# Patient Record
Sex: FEMALE | Race: WHITE | NOT HISPANIC OR LATINO | Employment: FULL TIME
[De-identification: names, ages, dates, MRNs, and addresses within clinical notes are randomized per-mention and may not be internally consistent; named-entity substitution may affect disease eponyms.]

---

## 2020-05-21 ENCOUNTER — RECORDS - HEALTHEAST (OUTPATIENT)
Dept: ADMINISTRATIVE | Facility: OTHER | Age: 25
End: 2020-05-21

## 2020-05-21 ENCOUNTER — TRANSFERRED RECORDS (OUTPATIENT)
Dept: HEALTH INFORMATION MANAGEMENT | Facility: CLINIC | Age: 25
End: 2020-05-21

## 2020-05-21 LAB
HBA1C MFR BLD: 4.8 % (ref 0–5.6)
HBA1C MFR BLD: 4.8 % (ref 0–5.7)
HIV 1&2 EXT: NORMAL
HIV 1&2 EXT: NORMAL

## 2020-06-10 LAB — PAP SMEAR - HIM PATIENT REPORTED: NORMAL

## 2020-10-12 ENCOUNTER — AMBULATORY - HEALTHEAST (OUTPATIENT)
Dept: LAB | Facility: CLINIC | Age: 25
End: 2020-10-12

## 2020-10-12 ENCOUNTER — TRANSFERRED RECORDS (OUTPATIENT)
Dept: HEALTH INFORMATION MANAGEMENT | Facility: CLINIC | Age: 25
End: 2020-10-12

## 2020-10-12 ENCOUNTER — RECORDS - HEALTHEAST (OUTPATIENT)
Dept: ADMINISTRATIVE | Facility: OTHER | Age: 25
End: 2020-10-12

## 2020-10-12 DIAGNOSIS — O99.810 ABNORMAL MATERNAL GLUCOSE TOLERANCE, ANTEPARTUM: ICD-10-CM

## 2020-10-12 LAB
FASTING STATUS PATIENT QL REPORTED: YES
GLUCOSE 1H P 100 G GLC PO SERPL-MCNC: 181 MG/DL (ref 70–179)
GLUCOSE 2H P 100 G GLC PO SERPL-MCNC: 138 MG/DL (ref 70–154)
GLUCOSE 3H P 100 G GLC PO SERPL-MCNC: 160 MG/DL (ref 70–139)
GLUCOSE P FAST SERPL-MCNC: 88 MG/DL (ref 70–94)

## 2020-10-13 ENCOUNTER — RECORDS - HEALTHEAST (OUTPATIENT)
Dept: ADMINISTRATIVE | Facility: OTHER | Age: 25
End: 2020-10-13

## 2020-10-15 ENCOUNTER — COMMUNICATION - HEALTHEAST (OUTPATIENT)
Dept: EDUCATION SERVICES | Facility: CLINIC | Age: 25
End: 2020-10-15

## 2020-10-19 ENCOUNTER — RECORDS - HEALTHEAST (OUTPATIENT)
Dept: ADMINISTRATIVE | Facility: OTHER | Age: 25
End: 2020-10-19

## 2020-10-20 ENCOUNTER — RECORDS - HEALTHEAST (OUTPATIENT)
Dept: HEALTH INFORMATION MANAGEMENT | Facility: CLINIC | Age: 25
End: 2020-10-20

## 2020-10-22 ENCOUNTER — PATIENT OUTREACH (OUTPATIENT)
Dept: EDUCATION SERVICES | Facility: CLINIC | Age: 25
End: 2020-10-22
Payer: COMMERCIAL

## 2020-10-22 DIAGNOSIS — O24.410 DIET CONTROLLED GESTATIONAL DIABETES MELLITUS (GDM) IN THIRD TRIMESTER: Primary | ICD-10-CM

## 2020-10-22 PROCEDURE — G0108 DIAB MANAGE TRN  PER INDIV: HCPCS

## 2020-10-22 RX ORDER — BLOOD-GLUCOSE METER
EACH MISCELLANEOUS
Qty: 1 KIT | Refills: 0 | Status: SHIPPED | OUTPATIENT
Start: 2020-10-22 | End: 2021-12-06

## 2020-10-22 RX ORDER — LANCETS 33 GAUGE
1 EACH MISCELLANEOUS 4 TIMES DAILY
Qty: 100 EACH | Refills: 3 | Status: SHIPPED | OUTPATIENT
Start: 2020-10-22 | End: 2021-12-06

## 2020-10-22 NOTE — PROGRESS NOTES
"PHONE Diabetes Self-Management Training - Gestational Diabetes  Patient verbally consented to the telephone visit service today: yes    \"our first pregnancy that has progressed this far\"   ~ Family history of DB    SUBJECTIVE/OBJECTIVE:  Winsome Edwards presents today for education related to gestational diabetes.  She is accompanied by self    Patient's gestational diabetes management related comments/concerns: want to make sure this doesn't negatively impact the baby    Patient's emotional response to diabetes: expresses readiness to learn    Relevant co-morbidities and related health problems:  Significant for:  none and Managing my stress to maintain health blood pressure    Current health service and resource utilization related to diabetes (hyperglycemia, hypoglycemia, etc.):  None    There were no vitals taken for this visit.    Pre pregnancy weight: 160-170#  Usually, but lost a baby at 3 months last year and didn't lose the weight, thinks 175-180#  Weight  Now 210.4#    Estimated Date of Delivery: 12/21  Fasting Glucose  No results found for: GLC    1 hour OGTT  No results found for: GLU1    3 hour OGTT    Fasting  No results found for: GLF    1 hour  No results found for: GL1    2 hour  No results found for: GL2    3 hour  No results found for: GL3    History   Smoking Status     Not on file   Smokeless Tobacco     Not on file       Lifestyle and Health Behaviors:  Physical Activity: before the snow, walked 0789-8075 steps, now that they've plowed she hopes to get back to it, aware of getting steps around house, up once an hour    Nutrition:  ~ chronic migraines to has to eat or gets ill; nausea early on so ate lots of bread  Patient eats 3 meals and 0-2 snacks per day.    Breakfast:  Egg sausage cheese breakfast croissant sandwich 1x/week, sometimes oatmeal, today fried egg with 3 garza and 3 cheese, or cheerios with milk with 2-4 oz milk  Snack:  Cup of tea or maybe some cheese and a cutie  Lunch:  Spinach " "wraps 16 g carb (instead of bread) with chic or egg salad or \"preg approved tuna\" with spinach/shai/samuel in wrap or sandwich  Snack:  SF pudding or SF jello with whip cream  Dinner:  Protein (chick, pork, salmon) and veggies (potato, corn, green beans mix) or I not potato small side of \"veggie based\" pasta (tomatoes and mushroom) (has 41g/serving but she doesn't have all) to get some starch in  Bedtime Snack:  No- if didn't have it already might have SF jello, occasion milk    Other time(s) food is eaten? no     Beverages: Water all /day, maybe tea or decaf coffee    Cultural/Taoism diet restrictions: Yes - allergic to banana and melon and avocado and almonds (anaphalactic)    Biggest challenge to healthy eating is:  knowing what to eat    Pre-Senthil Vitamin: Yes    Supplements: Yes - but sleep tabs and migraine tabs  Experiencing nausea?  not any more     Socio/Economic/Cultural considerations:  Support System: family and spouse/significant other (mom is a nurse in [place])    Cultural Influences/Ethnic Background:  American    Health Literacy/Numeracy:  \"With diabetes, it's helpful to use forms and log books to write down blood sugars and what you're eating at times to help understand how foods affect your blood sugars. With this in mind:    How confident are you at filling out medical forms, such as these by yourself?   Extremely  ~ English major , learns by reading, comfortable     Health Beliefs and Attitudes:   Stage of Change: PREPARATION (Decided to change - considering how)    ASSESSMENT:  \"It's nice to know I can still eat carbs\", she cut back, thought it would be easier to add back in if needed.   She expresses motivation, willingness to do what is needed for self care.   Her mom is a nurse out of state and in touch about every day- is supportive.     INTERVENTION:  Ordered One Touch Verio meter and supplies, ketone stix    Educational topics covered today:  GDM diagnosis, pathophysiology, Risks and " Complications of GDM, Means of controlling GDM, Using a Blood Glucose Monitor, Blood Glucose Goals, Logging and Interpreting Glucose Results, Ketone Testing, When to Call a Diabetes Educator or OB Provider, Healthy Eating During Pregnancy, Counting Carbohydrates, Meal Planning for GDM, and Physical Activity    Educational materials provided by email louise:   Danitza Understanding Gestational Diabetes  GDM Log Book  Sharps Disposal  Care After Delivery    Pt verbalized understanding of concepts discussed and recommendations provided today.     PLAN:  Check glucose 4 times daily, before breakfast and 1 hour after each meal.     Check Ketones daily for one week, if negative, reduce testing to once a week.     Physical activity recommended: as tolerated daily.    Meal plan: 2-3 carbs at breakfast, 3-4 carbs at lunch, 3-2 carbs at supper, 1-2 carbs at 3 snacks a day.  Follow consistent CHO meal plan, eat CHO and protein/fat at all meals/snacks.    Call/e-mail/MyChart message diabetes educator if 3 or more blood sugars are above the goal in 1 week or if ketones are positive.     Call/e-mail/MyChart message with questions/concerns.    FOLLOW-UP:  Call or e-mail educator if 3 or more blood sugars are above goal in 1 week- advised let us know Monday or Tuesday if numbers are elevated over the weekend despite following the meal plan.  Call or e-mail with questions or concerns.  Appointment scheduled on 10/29. Please email numbers and food log the night before.    Goals:  1) add carb to meals per recommendation  2) add snacks, ok for small AM and PM, but carb + protein at HS  3) begin BGM and ketone checks    Tash Ho RD, LD, CDE    Time Spent: 62 minutes  Encounter type: Individual    Any diabetes medication dose changes were made via the CDE Protocol and Collaborative Practice Agreement with the patient's OB/GYN provider. A copy of this encounter was shared with the provider.

## 2020-10-23 ENCOUNTER — RECORDS - HEALTHEAST (OUTPATIENT)
Dept: HEALTH INFORMATION MANAGEMENT | Facility: CLINIC | Age: 25
End: 2020-10-23

## 2020-10-29 ENCOUNTER — VIRTUAL VISIT (OUTPATIENT)
Dept: EDUCATION SERVICES | Facility: CLINIC | Age: 25
End: 2020-10-29
Payer: COMMERCIAL

## 2020-10-29 DIAGNOSIS — O24.410 DIET CONTROLLED GESTATIONAL DIABETES MELLITUS (GDM) IN THIRD TRIMESTER: Primary | ICD-10-CM

## 2020-10-29 PROCEDURE — G0108 DIAB MANAGE TRN  PER INDIV: HCPCS | Mod: 95

## 2020-10-29 NOTE — LETTER
10/29/2020         RE: Winsome Edwards  [address]  [facility] [address]        Dear Colleague,    Thank you for referring your patient, Winsome Edwards, to the [facility]. Please see a copy of my visit note below.    Diabetes Self-Management Education & Support    Patient verbally consented to the telephone visit service today: yes    SUBJECTIVE/OBJECTIVE:  Presents for education related to gestational diabetes.    Accompanied by: Self  Diabetes management related comments/concerns: No questions, pretty easy to use the meter, adjusting meals based on numbers. Overall things have been going well. Wasn't able to get glucose meter and monitoring supplies until Monday after lunch, but has been checking since then and overall most glucose numbers in goal.  Gestational weeks: 32  Had any babies over 9 lbs: No  Previously had Gestational Diabetes: No    Cultural Influences/Ethnic Background:  American    There were no vitals taken for this visit.    Weight at last OB visit was 212 lbs.    Estimated Date of Delivery: Data Unavailable    Blood Glucose/Ketone Log:    Date Ketones Fasting Post Breakfast Post Lunch Post Supper   10/26    147 (rice) 123   10/27 neg 94 136 98 139   10/28 neg 97 133 131 128   10/29 neg 95 123 130      Lifestyle and Health Behaviors:  Exercise:: Yes  Days per week of moderate to strenuous exercise (like a brisk walk): 7(3202-0281 steps/day)  On average, minutes per day of exercise at this level: 30  How intense was your typical exercise? : Moderate (like brisk walking)  Exercise Minutes per Week: 210  Meal planning/habits: Carb counting  Meals include: Breakfast, Lunch, Dinner, Morning Snack, Afternoon Snack, Evening Snack  Breakfast: croissant egg, sausage, cheese breakfast sandwich, sometimes with milk but usually water OR 4 slices garza, egg, 2 slices toast  Lunch: spinach wrap with samuel, salad greens, chicken, tomato, ranch + christiano cheese + cutie OR 10/26 had  chinese rice and pot-stickers  Dinner: 1/4 pizza OR ravioli with marinara and corn  Snacks: AM - coffee or milk or cheese, PM - sugar free pudding, Bedtime - English muffin with pistachio butter  Beverages: Water, Coffee, Milk  How many servings of fruits/vegetables per day: -1    Healthy Coping:  Emotional response to diabetes: Ready to learn, Concern for health and well-being  Informal Support system:: Family, Spouse  Stage of change: ACTION (Actively working towards change)    Current Management:  Taking medications for gestational diabetes?: No  Difficulty affording diabetes medication?: No  Difficulty affording diabetes testing supplies?: No    ASSESSMENT:  Ketones: in goal with all negative.   Fasting blood glucoses: 66% in target.  After breakfast: 100% in target.  After lunch: 75% in target.  After dinner: 100% in target.    Patient may benefit from slightly more protein/fat at bedtime snack to help reduce liver glucose output overnight. She will try adding cheese or cottage cheese or Greek yogurt at bedtime snack.     INTERVENTION:  Educational topics covered today:  What to expect after delivery, Future testing for Type 2 diabetes (2 hour OGTT at 6 week post-partum check-up and annual fasting blood glucose level), Risk of GDM and planning ahead for future pregnancies, Recommended lifestyle interventions for reducing the risk of Type 2 Diabetes, When to Call a Diabetes Educator or OB Provider    Educational Materials provided today:  Danitza Preventing Diabetes    PLAN:  Check glucose 4 times daily.  Check ketones once a week when readings are consistently negative.  Continue with recommended physical activity.  Continue to follow recommended meal plan: 30 g carbs at breakfast, 45-60 g carbs at lunch, 45-60 g carbs at supper, 15-30 g carbs at snacks.  Follow consistent CHO meal plan, eat CHO and protein/fat at all meals/snacks.    Call/e-mail/[employer] message diabetes educator if 3 or more blood sugars are  above the goal in 1 week or if ketones are positive.    Lexus Rowland, MPH, RDN, LD, CDCES   Time Spent: 30 minutes  Encounter Type: Individual    Any diabetes medication dose changes were made via the CDE Protocol and Collaborative Practice Agreement with the patient's OB/GYN provider. A copy of this encounter was shared with the provider.

## 2020-10-29 NOTE — PROGRESS NOTES
Diabetes Self-Management Education & Support    Patient verbally consented to the telephone visit service today: yes    SUBJECTIVE/OBJECTIVE:  Presents for education related to gestational diabetes.    Accompanied by: Self  Diabetes management related comments/concerns: No questions, pretty easy to use the meter, adjusting meals based on numbers. Overall things have been going well. Wasn't able to get glucose meter and monitoring supplies until Monday after lunch, but has been checking since then and overall most glucose numbers in goal.  Gestational weeks: 32  Had any babies over 9 lbs: No  Previously had Gestational Diabetes: No    Cultural Influences/Ethnic Background:  American    There were no vitals taken for this visit.    Weight at last OB visit was 212 lbs.    Estimated Date of Delivery: Data Unavailable    Blood Glucose/Ketone Log:    Date Ketones Fasting Post Breakfast Post Lunch Post Supper   10/26    147 (rice) 123   10/27 neg 94 136 98 139   10/28 neg 97 133 131 128   10/29 neg 95 123 130      Lifestyle and Health Behaviors:  Exercise:: Yes  Days per week of moderate to strenuous exercise (like a brisk walk): 7(7098-2300 steps/day)  On average, minutes per day of exercise at this level: 30  How intense was your typical exercise? : Moderate (like brisk walking)  Exercise Minutes per Week: 210  Meal planning/habits: Carb counting  Meals include: Breakfast, Lunch, Dinner, Morning Snack, Afternoon Snack, Evening Snack  Breakfast: croissant egg, sausage, cheese breakfast sandwich, sometimes with milk but usually water OR 4 slices garza, egg, 2 slices toast  Lunch: spinach wrap with samuel, salad greens, chicken, tomato, ranch + christiano cheese + cutie OR 10/26 had chinese rice and pot-stickers  Dinner: 1/4 pizza OR ravioli with marinara and corn  Snacks: AM - coffee or milk or cheese, PM - sugar free pudding, Bedtime - English muffin with pistachio butter  Beverages: Water, Coffee, Milk  How many servings of  fruits/vegetables per day: -1    Healthy Coping:  Emotional response to diabetes: Ready to learn, Concern for health and well-being  Informal Support system:: Family, Spouse  Stage of change: ACTION (Actively working towards change)    Current Management:  Taking medications for gestational diabetes?: No  Difficulty affording diabetes medication?: No  Difficulty affording diabetes testing supplies?: No    ASSESSMENT:  Ketones: in goal with all negative.   Fasting blood glucoses: 66% in target.  After breakfast: 100% in target.  After lunch: 75% in target.  After dinner: 100% in target.    Patient may benefit from slightly more protein/fat at bedtime snack to help reduce liver glucose output overnight. She will try adding cheese or cottage cheese or Greek yogurt at bedtime snack.     INTERVENTION:  Educational topics covered today:  What to expect after delivery, Future testing for Type 2 diabetes (2 hour OGTT at 6 week post-partum check-up and annual fasting blood glucose level), Risk of GDM and planning ahead for future pregnancies, Recommended lifestyle interventions for reducing the risk of Type 2 Diabetes, When to Call a Diabetes Educator or OB Provider    Educational Materials provided today:  Danitza Preventing Diabetes    PLAN:  Check glucose 4 times daily.  Check ketones once a week when readings are consistently negative.  Continue with recommended physical activity.  Continue to follow recommended meal plan: 30 g carbs at breakfast, 45-60 g carbs at lunch, 45-60 g carbs at supper, 15-30 g carbs at snacks.  Follow consistent CHO meal plan, eat CHO and protein/fat at all meals/snacks.    Call/e-mail/[employer] message diabetes educator if 3 or more blood sugars are above the goal in 1 week or if ketones are positive.    Lexus Rowland, MPH, RDN, LD, CDCES   Time Spent: 30 minutes  Encounter Type: Individual    Any diabetes medication dose changes were made via the CDE Protocol and Collaborative Practice Agreement  with the patient's OB/GYN provider. A copy of this encounter was shared with the provider.

## 2020-10-29 NOTE — PATIENT INSTRUCTIONS
Follow up with diabetes education for blood glucose and ketone review on 11/4/20 at 9:30 am    Plan to share your glucose and ketone information with diabetes education once a week, unless otherwise directed.     1. Check glucose 4 times daily, before breakfast daily and 1 hour after each meal, as recommended.    2. Check ketones daily or once a week after they have been negative for 7 days in a row. If ketones are elevated, let your diabetes educator know and continue to check daily until they are negative for 7 days in a row.    3. Continue with recommended physical activity.    4. Continue to follow recommended meal plan: 30 g carbs at breakfast, 45-60 g carbs at lunch, 45-60 g carbs at supper, 15-30 g carbs at snacks.  Follow consistent CHO meal plan, eat CHO and protein/fat at all meals/snacks.    5. Follow-up with OB doctor as recommended.    6. Call or MyChart message your diabetes educator if 3 or more blood sugars are above the goal in 1 week or if ketones are elevated (trace or above).       AFTER YOU DELIVER:  - Continue with healthy eating and physical activity to get back to your pre-pregnancy weight.   - Have a follow-up 2-hour Glucose Tolerance Test at your 6-week post-partum check-up.   - Have your fasting blood sugar checked once a year.  - Plan ahead for future pregnancies - eat healthy, keep active, work with your doctor to check for gestational diabetes early on in the pregnancy and check blood sugars as recommended by your doctor.

## 2020-11-04 ENCOUNTER — MYC MEDICAL ADVICE (OUTPATIENT)
Dept: EDUCATION SERVICES | Facility: OTHER | Age: 25
End: 2020-11-04

## 2020-11-04 ENCOUNTER — VIRTUAL VISIT (OUTPATIENT)
Dept: EDUCATION SERVICES | Facility: CLINIC | Age: 25
End: 2020-11-04
Payer: COMMERCIAL

## 2020-11-04 DIAGNOSIS — O24.410 DIET CONTROLLED GESTATIONAL DIABETES MELLITUS (GDM) IN THIRD TRIMESTER: Primary | ICD-10-CM

## 2020-11-04 PROCEDURE — 98967 PH1 ASSMT&MGMT NQHP 11-20: CPT | Mod: 95

## 2020-11-04 NOTE — PROGRESS NOTES
"  Gestational Diabetes Follow-up Visit    SUBJECTIVE/OBJECTIVE:  Winsome Edwards presents today for education and evaluation of glucose control related to gestational diabetes  Patient verbally consented to the telephone visit service today: yes    She is accompanied by self    Patient's gestational diabetes management related comments/concerns: ketones in urine     Blood Glucose/Ketone Log:      Date Ketones Fasting Post Breakfast Post Lunch Post Supper   10/30 neg 87 153 117 120   10/31 neg 95 116 88 131   11/1 neg 89 102 141 155   11/2 neg 88 145 110 136   11/3 neg 90 97 110 101   11/4 Small trace 85 131      Food log 11/3  B: granola bar   S: SF pudding  L: chicken tenders small fries   D: sausage casserole ( cauli flower rice, veggies +sausage )   S: SF pudding with SF whip cream     Current gestational diabetes management:    Taking medications for gestational diabetes? No     Physical Activity: no regular exercise program    Nutrition:  Patient eats 3 meals and 3 snacks per day and has an inconsistent intake of carbohydrates.    ASSESSMENT:  Ketones: trace .   Fasting blood glucoses: 100% in target.  After breakfast: 66% in target.  After lunch: 80% in target.  After dinner: 80% in target.  Pt voices she has chronic migraines and have been getting worse lately. She feels calling in her numbers causes her stress because she does not like to do a \"bad job\"Reenforced with patient that our team is here to support her and her baby to have a healthy pregnancy and frequent monitoring is for the purpose of guidance / shifting plan when necessary.   Educated pt on low carb diet, need for consistent CHO and Ketone formation. Pt verbalized understanding and will eat bedtime snack and add a carb to her dinner meal     Health Beliefs and Attitudes:   Stage of Change: MAINTENANCE (Working to maintain change, with risk of relapse)    INTERVENTION:  Educational topics covered today:  What to expect after delivery, Future " testing for Type 2 diabetes (2 hour OGTT at 6 week post-partum check-up and annual fasting blood glucose level), Risk of GDM and planning ahead for future pregnancies, Recommended lifestyle interventions for reducing the risk of Type 2 Diabetes, When to Call a Diabetes Educator or OB Provider    Educational Materials provided today:  Danitza Preventing Diabetes    PLAN:  Check glucose 4 times daily.  Check ketones once a week when readings are consistently negative.  Continue with recommended physical activity.  Continue to follow recommended meal plan: 3-4 choices  carbs at breakfast, 4-5 carbs at lunch, 4-5 carbs at supper, 1-2 carbs at snacks.  Follow consistent CHO meal plan, eat CHO and protein/fat at all meals/snacks.    Call/e-mail/[employer] message diabetes educator if 3 or more blood sugars are above the goal in 1 week or if ketones are positive.     FOLLOW-UP:  Follow up with diabetes educator in 1 week via [employer]. [employer] activation sent to pt today.     Call/e-mail/[employer] message diabetes educator if 3 or more blood sugars are above the goal in 1 week or if ketones are positive.     Noe Corral, MS, RD, LD, CDE     The author of this note documented a reason for not sharing it with the patient.    Time spent was 19 minutes  Encounter type: Individual    Any diabetes medication dose changes were made via the CDE Protocol and Collaborative Practice Agreement with the patient's referring provider. A copy of this encounter was shared with the provider.

## 2020-11-04 NOTE — LETTER
"    11/4/2020         RE: Winsome Edwards  [address]  [facility] [address]        Dear Colleague,    Thank you for referring your patient, Winsome Edwards, to the [facility]. Please see a copy of my visit note below.      Gestational Diabetes Follow-up Visit    SUBJECTIVE/OBJECTIVE:  Winsome Edwards presents today for education and evaluation of glucose control related to gestational diabetes  Patient verbally consented to the telephone visit service today: yes    She is accompanied by self    Patient's gestational diabetes management related comments/concerns: ketones in urine     Blood Glucose/Ketone Log:      Date Ketones Fasting Post Breakfast Post Lunch Post Supper   10/30 neg 87 153 117 120   10/31 neg 95 116 88 131   11/1 neg 89 102 141 155   11/2 neg 88 145 110 136   11/3 neg 90 97 110 101   11/4 Small trace 85 131      Food log 11/3  B: granola bar   S: SF pudding  L: chicken tenders small fries   D: sausage casserole ( cauli flower rice, veggies +sausage )   S: SF pudding with SF whip cream     Current gestational diabetes management:    Taking medications for gestational diabetes? No     Physical Activity: no regular exercise program    Nutrition:  Patient eats 3 meals and 3 snacks per day and has an inconsistent intake of carbohydrates.    ASSESSMENT:  Ketones: trace .   Fasting blood glucoses: 100% in target.  After breakfast: 66% in target.  After lunch: 80% in target.  After dinner: 80% in target.  Pt voices she has chronic migraines and have been getting worse lately. She feels calling in her numbers causes her stress because she does not like to do a \"bad job\"Reenforced with patient that our team is here to support her and her baby to have a healthy pregnancy and frequent monitoring is for the purpose of guidance / shifting plan when necessary.   Educated pt on low carb diet, need for consistent CHO and Ketone formation. Pt verbalized understanding and will eat bedtime snack " and add a carb to her dinner meal     Health Beliefs and Attitudes:   Stage of Change: MAINTENANCE (Working to maintain change, with risk of relapse)    INTERVENTION:  Educational topics covered today:  What to expect after delivery, Future testing for Type 2 diabetes (2 hour OGTT at 6 week post-partum check-up and annual fasting blood glucose level), Risk of GDM and planning ahead for future pregnancies, Recommended lifestyle interventions for reducing the risk of Type 2 Diabetes, When to Call a Diabetes Educator or OB Provider    Educational Materials provided today:  Danitza Preventing Diabetes    PLAN:  Check glucose 4 times daily.  Check ketones once a week when readings are consistently negative.  Continue with recommended physical activity.  Continue to follow recommended meal plan: 3-4 choices  carbs at breakfast, 4-5 carbs at lunch, 4-5 carbs at supper, 1-2 carbs at snacks.  Follow consistent CHO meal plan, eat CHO and protein/fat at all meals/snacks.    Call/e-mail/[employer] message diabetes educator if 3 or more blood sugars are above the goal in 1 week or if ketones are positive.     FOLLOW-UP:  Follow up with diabetes educator in 1 week via [employer]. [employer] activation sent to pt today.     Call/e-mail/[employer] message diabetes educator if 3 or more blood sugars are above the goal in 1 week or if ketones are positive.     Noe Corral, MS, RD, LD, CDE       Time spent was 19 minutes  Encounter type: Individual    Any diabetes medication dose changes were made via the CDE Protocol and Collaborative Practice Agreement with the patient's referring provider. A copy of this encounter was shared with the provider.

## 2020-11-11 NOTE — TELEPHONE ENCOUNTER
Gestational Diabetes Follow-up    Subjective/Objective:    Winsome Edwards sent in blood glucose log for review. Last date of communication was: 11/4/2020.    Gestational diabetes is being managed with diet and activity    Taking diabetes medications: no    Estimated Date of Delivery: Data Unavailable    BG/Food Log:         Assessment:    Ketones: neg.   Fasting blood glucoses: 89% in target.  After breakfast: 66% in target.  After lunch: 100% in target.  After dinner: 66% in target.    Plan/Response:  No changes in the patient's current treatment plan.  Follow-up in 1 week.    DMITRI Wong CDE    Any diabetes medication dose changes were made via the CDE Protocol and Collaborative Practice Agreement with the patient's OB/GYN provider. A copy of this encounter was shared with the provider.

## 2020-11-16 ENCOUNTER — TELEPHONE (OUTPATIENT)
Dept: EDUCATION SERVICES | Facility: CLINIC | Age: 25
End: 2020-11-16

## 2020-11-16 NOTE — TELEPHONE ENCOUNTER
Gestational Diabetes Follow-up    Subjective/Objective:    Winsome Edwards sent in blood glucose log for review. Last date of communication was: 11/4/2020.    Gestational diabetes is being managed with diet and activity    Taking diabetes medications: no         Estimated Date of Delivery: Data Unavailable    BG/Food Log:   Winsome Reddy  1995    I tried to send this message through [employer] but it wouldn t let me for whatever reason. I am freaking out a bit and reaching out in hopes someone can give me some insight.     I have had good numbers for everything except the first thing in the morning. I do not understand what is happening. I have the same snack every night before bed and the last three days in a row have been high for some reason. Do we know what could be causing this?    I have a whole wheat english muffin with peanut butter, a light & fit yogurt, and a glass of milk before bed. I have attached a photo of my numbers since last Wednesday s check in.    Thank you,          Assessment:    Ketones: neg.   Fasting blood glucoses: 20% in target.  After breakfast: 100% in target.  Before lunch: -% in target.  After lunch: 100% in target.  Before dinner: -% in target.  After dinner: 100% in target.    Plan/Response:  Ahsan Khan,    Thank you for sending your numbers. I m glad you reached out with your concerns. Your fasting numbers are a bit high but all other blood sugars look great!  If you are having a whole wheat english muffin with peanut butter, a light & fit yogurt, and a glass of milk before bed, this may be too much carbohydrates.  Your bedtime snack should be 30 grams of carb with a 14 grams of protein.      Below is a list of snack ideas. You choose 2 items from the protein list and 2 items from the carbohydrate list.    So an example: 1 whole English muffin is 2 choices from the carbohydrate list and adding 2 TBSP of peanut butter would be 1 choice from the protein list, you would just  need 1 more choice from the protein list. If you like having milk before bed I would recommend you try   cup of the fairlife milk. It is higher in protein and lower in carbs.     Also sometimes one snack works better than another so you may want to try a couple of different combinations. I would like you to try a couple of different snacks over the next couple days to see if that will take care of your high fasting numbers. Plan on sending in your blood sugars again on Thursday so we can review them before the weekend. Make a note of the different snacks you try and we can make some additional suggestions if needed.   You really are doing a great job! Just a small change may be all that you need!          Katrin Feldman RN, [facility]  Diabetes         Any diabetes medication dose changes were made via the CDE Protocol and Collaborative Practice Agreement with the patient's referring provider. A copy of this encounter was shared with the provider.

## 2020-11-19 ENCOUNTER — TELEPHONE (OUTPATIENT)
Dept: EDUCATION SERVICES | Facility: CLINIC | Age: 25
End: 2020-11-19

## 2020-11-19 NOTE — TELEPHONE ENCOUNTER
Gestational Diabetes Follow-up    Subjective/Objective:    Winsome Grace sent in blood glucose log for review. Last date of communication was: 11-16-20.    Gestational diabetes is being managed with diet and activity    Taking diabetes medications: no    Estimated Date of Delivery: 12-21-20    BG/Food Log:       Assessment:    Ketones: Negative.   Fasting blood glucoses: 66% in target since  Making changes to bedtime snack on 11/16.  After breakfast: 100% in target.  After lunch: 100% in target.  After dinner: 100% in target.    Email from patient:  Winsome Reddy  1995    Okay so we left off on the pre-breakfast glucose test for 11/16. Everything has been fine as you can see. I switched my evening snacks to what is listed below:    11/16 - English Muffin with peanut butter and 1/2 cup of Fairlife milk  11/17 -  English Muffin with peanut butter and 1/2 cup of cottage cheese  11/18 - English Muffin with peanut butter and 1/2 cup of Fairlife milk     This morning it was high so I m wondering if maybe I had to much milk or peanut butter.     Thank you,  Winsome Edwards        Plan/Response:  No changes in the patient's current treatment plan.  Follow-up on Monday.    Email response:  Dear Winsome,    Thank you so much for sending in your records! After reviewing your records these are my recommendations:    Meal plan: Continue with the current bedtime snacks. It is okay that it doesn't work every night, we just want it to work most of the nights.     Blood sugar testing: No change.    Ketone testing: No change.    Follow-up: Please send records for review again on Monday November 23rd. Also, it looks like MyChart wasn't working for you because you had no care team members assigned. I added myself to your care team, so just pick my name to send a message to and it comes to the entire diabetes educator group.     Please feel free to reach out sooner with any questions or concerns. Thanks so much!    Ting  Macey ANDERS, [facility]      Any diabetes medication dose changes were made via the CDE Protocol and Collaborative Practice Agreement with the patient's OB/GYN provider. A copy of this encounter was shared with the provider.

## 2020-11-19 NOTE — LETTER
11/19/2020        RE: Winsome Edwards  [address] [place] [address]        Gestational Diabetes Follow-up    Subjective/Objective:    Winsome Edwards sent in blood glucose log for review. Last date of communication was: 11-16-20.    Gestational diabetes is being managed with diet and activity    Taking diabetes medications: no    Estimated Date of Delivery: 12-21-20    BG/Food Log:       Assessment:    Ketones: Negative.   Fasting blood glucoses: 66% in target since  Making changes to bedtime snack on 11/16.  After breakfast: 100% in target.  After lunch: 100% in target.  After dinner: 100% in target.    Email from patient:  Winsome Reddy Coon  1995    Okay so we left off on the pre-breakfast glucose test for 11/16. Everything has been fine as you can see. I switched my evening snacks to what is listed below:    11/16 - English Muffin with peanut butter and 1/2 cup of Fairlife milk  11/17 -  English Muffin with peanut butter and 1/2 cup of cottage cheese  11/18 - English Muffin with peanut butter and 1/2 cup of Fairlife milk     This morning it was high so I m wondering if maybe I had to much milk or peanut butter.     Thank you,  Winsome Edwards        Plan/Response:  No changes in the patient's current treatment plan.  Follow-up on Monday.    Email response:  Dear Winsome,    Thank you so much for sending in your records! After reviewing your records these are my recommendations:    Meal plan: Continue with the current bedtime snacks. It is okay that it doesn't work every night, we just want it to work most of the nights.     Blood sugar testing: No change.    Ketone testing: No change.    Follow-up: Please send records for review again on Monday November 23rd. Also, it looks like [employer] wasn't working for you because you had no care team members assigned. I added myself to your care team, so just pick my name to send a message to and it comes to the entire diabetes educator group.     Please feel free  to reach out sooner with any questions or concerns. Thanks so much!    Ting Choudhury RN, [facility]      Any diabetes medication dose changes were made via the CDE Protocol and Collaborative Practice Agreement with the patient's OB/GYN provider. A copy of this encounter was shared with the provider.            Sincerely,        Ting Choudhury, RN

## 2020-11-23 ENCOUNTER — MYC MEDICAL ADVICE (OUTPATIENT)
Dept: EDUCATION SERVICES | Facility: CLINIC | Age: 25
End: 2020-11-23

## 2020-11-23 NOTE — TELEPHONE ENCOUNTER
Gestational Diabetes Follow-up    Subjective/Objective:    Winsome Edwards sent in blood glucose log for review. Last date of communication was: 11-19-20.    Gestational diabetes is being managed with diet and activity    Taking diabetes medications: no    Estimated Date of Delivery: 12-21-20    BG/Food Log:           Assessment:    Ketones: Small.   Fasting blood glucoses: 75% in target since 11/20.  After breakfast: 100% in target.  After lunch: 100% in target.  After dinner: 100% in target.    Plan/Response:  Meal Plan Recommendation: Try a small carb (10-15 grams) snack in the middle of the night (ex: glass of milk, small granola bar, crackers).  Check ketones every morning for the next several days.   Follow-up on Friday.  MyChart response sent.    Ting Choudhury RN, [facility]      Any diabetes medication dose changes were made via the CDE Protocol and Collaborative Practice Agreement with the patient's OB/GYN provider. A copy of this encounter was shared with the provider.

## 2020-11-23 NOTE — LETTER
11/23/2020        RE: Winsome Edwards  [address] [place] [address]        Gestational Diabetes Follow-up    Subjective/Objective:    Winsome Coon sent in blood glucose log for review. Last date of communication was: 11-19-20.    Gestational diabetes is being managed with diet and activity    Taking diabetes medications: no    Estimated Date of Delivery: 12-21-20    BG/Food Log:           Assessment:    Ketones: Small.   Fasting blood glucoses: 75% in target since 11/20.  After breakfast: 100% in target.  After lunch: 100% in target.  After dinner: 100% in target.    Plan/Response:  Meal Plan Recommendation: Try a small carb (10-15 grams) snack in the middle of the night (ex: glass of milk, small granola bar, crackers).  Check ketones every morning for the next several days.   Follow-up on Friday.  MyChart response sent.    Ting Choudhury RN, [facility]      Any diabetes medication dose changes were made via the CDE Protocol and Collaborative Practice Agreement with the patient's OB/GYN provider. A copy of this encounter was shared with the provider.            Sincerely,        Ting Choudhury RN

## 2020-11-24 ENCOUNTER — MYC MEDICAL ADVICE (OUTPATIENT)
Dept: EDUCATION SERVICES | Facility: CLINIC | Age: 25
End: 2020-11-24

## 2020-12-02 ENCOUNTER — VIRTUAL VISIT (OUTPATIENT)
Dept: EDUCATION SERVICES | Facility: CLINIC | Age: 25
End: 2020-12-02
Payer: COMMERCIAL

## 2020-12-02 ENCOUNTER — MYC MEDICAL ADVICE (OUTPATIENT)
Dept: EDUCATION SERVICES | Facility: CLINIC | Age: 25
End: 2020-12-02

## 2020-12-02 ENCOUNTER — COMMUNICATION - HEALTHEAST (OUTPATIENT)
Dept: EDUCATION SERVICES | Facility: CLINIC | Age: 25
End: 2020-12-02

## 2020-12-02 DIAGNOSIS — O24.414 INSULIN CONTROLLED GESTATIONAL DIABETES MELLITUS (GDM) IN THIRD TRIMESTER: ICD-10-CM

## 2020-12-02 DIAGNOSIS — O24.410 DIET CONTROLLED GESTATIONAL DIABETES MELLITUS (GDM) IN THIRD TRIMESTER: Primary | ICD-10-CM

## 2020-12-02 PROCEDURE — 98968 PH1 ASSMT&MGMT NQHP 21-30: CPT | Mod: 95

## 2020-12-02 NOTE — TELEPHONE ENCOUNTER
Gestational Diabetes Follow-up    Subjective/Objective:    Winsome Edwards sent in blood glucose log for review. Last date of communication was: 11/24/2020    Gestational diabetes is being managed with diet and activity    Taking diabetes medications: no    Estimated Date of Delivery: Data Unavailable    BG/Food Log:         Assessment:    Ketones: na.   Fasting blood glucoses: 20% in target.  After breakfast: 75% in target.  After lunch: 100% in target.  After dinner: 100% in target.    Fasting blood sugars have been ~60% in target for the last 2 weeks now, and have now been consistently above target. Would recommend initiation of insulin - will send [employer] message offering insulin start phone visit if desired.     Plan/Response:  Recommend that patient begin NPH insulin - will send fax to [facility] recommending insulin, and route to [facility] CDEs to coordinate insulin start and [facility] to [facility] in [place]    DMITRI Wong CDE    Any diabetes medication dose changes were made via the CDE Protocol and Collaborative Practice Agreement with the patient's OB/GYN provider. A copy of this encounter was shared with the provider.

## 2020-12-02 NOTE — LETTER
12/2/2020         RE: Winsome Edwards  [address]  [place] [address]        Dear Colleague,    Thank you for referring your patient, Winsome Edwards, to the [facility]. Please see a copy of my visit note below.    No notes on file    Gestational Diabetes Follow-up     Subjective/Objective:     Winsome Edwards sent in blood glucose log for review. Last date of communication was: 11/24/2020     Gestational diabetes is being managed with diet and activity     Taking diabetes medications: no     Estimated Date of Delivery: Data Unavailable     BG/Food Log:          Assessment:     Ketones: na.   Fasting blood glucoses: 20% in target.  After breakfast: 75% in target.  After lunch: 100% in target.  After dinner: 100% in target.     Fasting blood sugars have been ~60% in target for the last 2 weeks now, and have now been consistently above target. Would recommend initiation of insulin - will send August message offering insulin start phone visit if desired.      Plan/Response:  Recommend that patient begin NPH insulin - will send fax to [facility] recommending insulin, and route to [facility] CDEs to coordinate insulin start and send to [facility] in [place]     DMITRI Wong CDE     Any diabetes medication dose changes were made via the CDE Protocol and Collaborative Practice Agreement with the patient's OB/GYN provider. A copy of this encounter was shared with the provider.

## 2020-12-02 NOTE — PROGRESS NOTES
Patient verbally consented to the telephone visit service today: yes    Gestational Diabetes Follow-up    Subjective/Objective:    Winsome Edwards was called for insulin instruction phone call. Last date of communication was: 12/2/2020.    Assessment:    Winsome was given insulin instruction for NPH pens over the phone. She understands that she will be [facility], and she will follow her blood sugars through delivery.     Plan/Response:  Recommend that patient begin 8 units NPH insulin - prescription already sent to pharmacy, scheduled with Mariely 12/9    DMITRI Wong CDE  Time SPent: 27 minutes    Any diabetes medication dose changes were made via the CDE Protocol and Collaborative Practice Agreement with the patient's OB/GYN provider. A copy of this encounter was shared with the provider.

## 2020-12-09 ENCOUNTER — OFFICE VISIT - HEALTHEAST (OUTPATIENT)
Dept: ENDOCRINOLOGY | Facility: CLINIC | Age: 25
End: 2020-12-09

## 2020-12-09 DIAGNOSIS — O24.414 INSULIN CONTROLLED GESTATIONAL DIABETES MELLITUS (GDM) IN THIRD TRIMESTER: ICD-10-CM

## 2020-12-10 ENCOUNTER — AMBULATORY - HEALTHEAST (OUTPATIENT)
Dept: OBGYN | Facility: CLINIC | Age: 25
End: 2020-12-10

## 2020-12-10 DIAGNOSIS — Z33.1 PREGNANT STATE, INCIDENTAL: ICD-10-CM

## 2020-12-11 ENCOUNTER — AMBULATORY - HEALTHEAST (OUTPATIENT)
Dept: LAB | Facility: CLINIC | Age: 25
End: 2020-12-11

## 2020-12-11 DIAGNOSIS — Z33.1 PREGNANT STATE, INCIDENTAL: ICD-10-CM

## 2020-12-13 ENCOUNTER — COMMUNICATION - HEALTHEAST (OUTPATIENT)
Dept: EMERGENCY MEDICINE | Facility: CLINIC | Age: 25
End: 2020-12-13

## 2020-12-13 ENCOUNTER — COMMUNICATION - HEALTHEAST (OUTPATIENT)
Dept: SCHEDULING | Facility: CLINIC | Age: 25
End: 2020-12-13

## 2020-12-14 ENCOUNTER — TRANSFERRED RECORDS (OUTPATIENT)
Dept: HEALTH INFORMATION MANAGEMENT | Facility: CLINIC | Age: 25
End: 2020-12-14

## 2020-12-15 ENCOUNTER — HOSPITAL ENCOUNTER (OUTPATIENT)
Dept: OBGYN | Facility: CLINIC | Age: 25
Discharge: HOME | End: 2020-12-15

## 2020-12-16 ENCOUNTER — ANESTHESIA - HEALTHEAST (OUTPATIENT)
Dept: OBGYN | Facility: CLINIC | Age: 25
End: 2020-12-16

## 2020-12-18 ENCOUNTER — HOME CARE/HOSPICE - HEALTHEAST (OUTPATIENT)
Dept: HOME HEALTH SERVICES | Facility: HOME HEALTH | Age: 25
End: 2020-12-18

## 2021-01-09 ENCOUNTER — HEALTH MAINTENANCE LETTER (OUTPATIENT)
Age: 26
End: 2021-01-09

## 2021-06-12 NOTE — TELEPHONE ENCOUNTER
10/14/2020 3:47pm inside DM Consult  [facility] [place]  - [phone]  Referring: Dr Giuliana Black  DX: GDM    No DM Order.

## 2021-06-13 NOTE — TELEPHONE ENCOUNTER
Date: 12/9/2020 Status: [facility]   Time: 11:40 AM Length: 20   Visit Type: VIDEO VISIT RETURN [1702] Copay: $0.00   Provider: Irma Chen NP

## 2021-06-13 NOTE — PROGRESS NOTES
"Winsome Edwards is a 25 y.o. female who is being evaluated via a billable video visit.      The patient has been notified of following:     \"This video visit will be conducted via a call between you and your physician/provider. We have found that certain health care needs can be provided without the need for an in-person physical exam.  This service lets us provide the care you need with a video conversation.  If a prescription is necessary we can send it directly to your pharmacy.  If lab work is needed we can place an order for that and you can then stop by our lab to have the test done at a later time.    Video visits are billed at different rates depending on your insurance coverage. Please reach out to your insurance provider with any questions.    If during the course of the call the physician/provider feels a video visit is not appropriate, you will not be charged for this service.\"    Patient has given verbal consent to a Video visit? Yes  How would you like to obtain your AVS? AVS Preference: Cosharedhart.  If dropped by the video visit, the video invitation should be sent to: [phone]   Will anyone else be joining your video visit? No        Video Start Time: 1140    Additional provider notes:      Reason for visit      1. Insulin controlled gestational diabetes mellitus (GDM) in third trimester        HPI     Winsome Edwards is a very pleasant 25 y.o. old female who presents for GESTATIONAL Diabetes Mellitus.  She is currently 38  weeks pregnant . Induction planned on .  Diagnosed with GDM based on an OGTT. She hasnot had  GDM in prior pregnancies.   Current carbohydrate intake:consistent with recommendations of 30g-60g-60g.  I have reviewed her blood glucose logs and note that the:  Fasting readings  are:in range on current regimen  Postprandial readings are:in range on current regimen  Current NPH dose: 8 units  Current Prandial insulin: 0  Blood glucose logs/meter brought in and data reviewed " and incorporated into decision-making.  Planned delivery at: [facility]  OBGYN: [facility]    Therapy/Interventions in the past:  She has been seen by the Diabetes Educator- and has received instruction on carbohydrate counting and  consistency.  Records from referring provider and other sources have also been reviewed and incorporated into decision-making.      TODAY:    Winsome is contacted today after starting insulin for GDM. She will be induced next week. She has provided BG and they look good.  She had a couple of elevations earlier in the week, but since then they have been fine. She also had two post lunch and two post dinner elevations, which seemed explainable. Baby is moving and she is having some swelling in her R leg. Baby is passing BPPs and NSTs and there are no current concerns from her OB. Postpartum instructions given today and all questions answered to her satisfaction.    12/01  101  112  119  110  12/02  102  137  155  172  12/03  90  128  122  121  12/04  97  132  91  120  12/05  94  115  153  134  12/06  89  113  120  154  12/07  90  133  136  100  12/08  93  128  109  99  12/09  92  128    Past Medical History       Patient Active Problem List   Diagnosis     Encounter for supervision of normal first pregnancy     Obesity     Pregnant     Insulin controlled gestational diabetes mellitus (GDM) in third trimester        Past Surgical History     No past surgical history on file.    Family History     No family history on file.    Social History     Social History     Tobacco Use     Smoking status: Not on file   Substance Use Topics     Alcohol use: Not on file     Drug use: Not on file       Review of Systems     Patient has no polyuria or polydipsia, no chest pain, dyspnea or TIA's, no numbness, tingling or pain in extremities  Remainder negative except as noted in HPI.      Vital Signs     There were no vitals taken for this visit.  Wt Readings from Last 3 Encounters:   No data found for Wt        Physical Exam         Assessment     1. Insulin controlled gestational diabetes mellitus (GDM) in third trimester        Plan     1. GESTATIONAL DIABETES-  Adjust dose as follows:    -NPH insulin8   units. Increase by 2 units every 2 days to keep fasting blood glucose below 95mg/dL  -Novolog 0  units with breakfast  -Novolog 0 units with lunch   -Novolog 0 units with dinner  -Increase by 0 units every 2 days to keep 1 hour after meal blood glucose less than 140mg/dL    We reviewed glucose goals of fasting blood glucose <95 mg/dL and 1 hour post prandial blood glucose of <140 mg/dL.    Monitor blood sugar 4 times daily: Fasting  and 1 hour after each meal.  Contact  this clinic [phone] if blood glucose is not within the above-mentioned goals.     We discussed the importance of excellent glycemic control during pregnancy to limit complications such as fetal macrosomia, shoulder dystocia,  hypoglycemia and hyperbilirubinemia.  I have discussed the patient's increased risk of recurrent GDM and/or development of type 2 diabetes later in life.        F/up with A1c 3 months postpartum with PCP.    May be a candidate for metformin postpartum as she has more than 1 risk factor for future Type 2 Diabetes Mellitus.    She will need a screening A1c at least annually, TLC- including carbohydrate control and exercise.    After you deliver the baby, it is suggested to check your blood sugar occasionally (1 - 2 times per week) for 6 weeks.     When you are not pregnant, normal blood sugars are:       Fasting (before eating anything in the morning)  - under 100 mg/dl    2 hours after meals under 140  The American Diabetes Association recommends:     a glucose tolerance test 6 weeks after you deliver the baby.         a hemoglobin Alc test yearly after delivery.  The Alc test is a 2-3 month average blood sugar reading.        Discuss getting these tests with your provider.       After delivery, and your provider  "has said that it is safe to be active, work toward getting 150 minutes each week of physical activity to decrease your risk of  getting type 2 diabetes.       Maintaining a healthy body weight will also decrease your risk of getting type 2 diabetes.               Lab Results     Hemoglobin A1c_EXT   Date Value Ref Range Status   05/21/2020 4.8 <5.7 % Final       No results found for: CHOL, HDL, LDLCALC, TRIG    No results found for: ALT, AST, GGT, ALKPHOS, BILITOT      Current Medications     Outpatient Medications Prior to Visit   Medication Sig Dispense Refill     blood glucose test strips Use 4 each As Directed daily. Dispense brand per patient's insurance at pharmacy discretion.       HUMULIN N NPH INSULIN KWIKPEN 100 unit/mL (3 mL) pen Inject 8 units every night before bed.  Increase by 2 units every 2 days until morning reading is less than 95.  Max daily dose 30 units. 9 mL 1     pen needle, diabetic 32 gauge x 5/32\" Ndle Use 1 each As Directed daily. 100 each 1     No facility-administered medications prior to visit.        Video-Visit Details    Type of service:  Video Visit    Video End Time (time video stopped): 1200  Originating Location (pt. Location): Home    Distant Location (provider location):  [facility]     Platform used for Video Visit: Rohini [id number]    "

## 2021-06-13 NOTE — TELEPHONE ENCOUNTER
Rx has been sent to requested pharmacy.  Please schedule patient in pregnancy clinic next week.  If need to DB, please ask Leah or me depending on the day.  Thanks,  Jenniffer

## 2021-06-13 NOTE — ANESTHESIA PROCEDURE NOTES
Epidural Block    Patient location during procedure: OB  Time Called: 12/16/2020 8:23 AM  Reason for Block:at surgeon's request and labor epidural  Staffing:  Performing  Anesthesiologist: Devang Romeo MD  Preanesthetic Checklist  Completed: patient identified, risks, benefits, and alternatives discussed, timeout performed, consent obtained, at patient's request, airway assessed, oxygen available, suction available, emergency drugs available and hand hygiene performed  Procedure  Patient position: sitting  Prep: ChloraPrep  Patient monitoring: continuous pulse oximetry, heart rate and blood pressure  Approach: midline  Location: L2-L3  Injection technique: SARAH saline  Number of Attempts:1  Needle  Needle type: Tuohy   Needle gauge: 17 G     Catheter in Space: 5  Assessment  Sensory level: T10  No complications      Additional Notes:  No CSF.  No Heme.  Infusion connected after test dose negative.  No issues.  Patient tolerated well. Pump reviewed and started.  Patients vital signs stable.  No LAST.  RN was in room the whole time.

## 2021-06-13 NOTE — ANESTHESIA PREPROCEDURE EVALUATION
Anesthesia Evaluation      Patient summary reviewed   No history of anesthetic complications     Airway   Mallampati: III  Neck ROM: full   Pulmonary - negative ROS and normal exam    breath sounds clear to auscultation                         Cardiovascular - negative ROS and normal exam  Exercise tolerance: good  Rhythm: regular  Rate: normal,         Neuro/Psych - negative ROS     Endo/Other    (+) diabetes mellitus, obesity, pregnant     GI/Hepatic/Renal - negative ROS      Other findings: Gravid. Denies PIH, or Preeclampsia.          Dental    (+) poor dentition and chipped                       Anesthesia Plan  Planned anesthetic: epidural  Labor epidural risks and benefits discussed with patient.  All questions answered.  Consent signed.  Patient wishes to proceed.  RN present.    ASA 3     Anesthetic plan and risks discussed with: patient and spouse  Anesthesia plan special considerations: increased risk of difficult airway,   Post-op plan: routine recovery

## 2021-06-13 NOTE — TELEPHONE ENCOUNTER
Coronavirus (COVID-19) Notification    Reason for call  Notify of POSITIVE  COVID-19 lab result, assess symptoms,  review [facility] recommendations    Lab Result   Lab test for 2019-nCoV rRt-PCR or SARS-COV-2 PCR  Oropharyngeal AND/OR nasopharyngeal swabs were POSITIVE for 2019-nCoV RNA [OR] SARS-COV-2 RNA (COVID-19) RNA     We have been unable to reach Patient by phone at this time to notify of their Positive COVID-19 result.  Left voicemail message requesting a call back to [phone] [facility] for results.        POSITIVE COVID-19 Letter sent.    Rhoda Magana RN

## 2021-06-13 NOTE — TELEPHONE ENCOUNTER
Patient has been followed by [facility] for gestational diabetes and now has elevated fasting glucose levels, requiring insulin start. Recommend NPH insulin at bedtime and for patient to get established with the [facility] with Mariely Chen NP.    Preferred pharmacy: [facility]    Once insulin is ordered, please route to scheduling team to make appointment with [facility].    Alethea Stephens RD [facility]

## 2021-06-13 NOTE — TELEPHONE ENCOUNTER
Call connected with [facility] d/t COVID diagnosis today.  Staff will speak with pt about her induction tomorrow.      Hue Masterson RN, FNA

## 2021-06-13 NOTE — ANESTHESIA POSTPROCEDURE EVALUATION
Patient: Winsome Edwards  * No procedures listed *  Anesthesia type: epidural    Patient location: Labor and Delivery  Last vitals: No vitals data found for the desired time range.    Post vital signs: stable  Level of consciousness: awake and responds to simple questions  Post-anesthesia pain: pain controlled  Post-anesthesia nausea and vomiting: no  Pulmonary: unassisted, return to baseline  Cardiovascular: stable and blood pressure at baseline  Hydration: adequate  Anesthetic events: no

## 2021-06-13 NOTE — TELEPHONE ENCOUNTER
"Coronavirus (COVID-19) Notification    Caller Name (Patient, parent, daughter/son, grandparent, etc)  Pt    Reason for call  Notify of Positive Coronavirus (COVID-19) lab results, assess symptoms,  review [facility] recommendations    Lab Result    Lab test:  2019-nCoV rRt-PCR or SARS-CoV-2 PCR    Oropharyngeal AND/OR nasopharyngeal swabs is POSITIVE for 2019-nCoV RNA/SARS-COV-2 PCR (COVID-19 virus)    RN Recommendations/Instructions per [facility] Coronavirus COVID-19 recommendations    Brief introduction script  Introduce self and then review script:  \"I am calling on behalf of [employer].  We were notified that your Coronavirus test (COVID-19) for was POSITIVE for the virus.  I have some information to relay to you but first I wanted to mention that the [place] Dept of Health will be contacting you shortly [it's possible MD already called Patient] to talk to you more about how you are feeling and other people you have had contact with who might now also have the virus.  Also, [facility] is Partnering with the [facility] for Covid-19 research, you may be contacted directly by research staff.\"    Assessment (Inquire about Patient's current symptoms)   Assessment   Current Symptoms at time of phone call: (if no symptoms, document No symptoms] None   Symptom onset (if applicable) 0     If at time of call, Patients symptoms hare worsened, the Patient should contact 911 or have someone drive them to Emergency Dept promptly:      If Patient calling 911, inform 911 personal that you have tested positive for the Coronavirus (COVID-19).  Place mask on and await 911 to arrive.    If Emergency Dept, If possible, please have another adult drive you to the Emergency Dept but you need to wear mask when in contact with other people.      Review information with Patient    Your result was positive. This means you have COVID-19 (coronavirus).  We have sent you a letter that reviews the information that " I'll be reviewing with you now.    How can I protect others?    If you have symptoms: stay home and away from others (self-isolate) until:    You've had no fever--and no medicine that reduces fever--for 1 full day (24 hours). And      Your other symptoms have gotten better. For example, your cough or breathing has improved. And     At least 10 days have passed since your symptoms started. (If you ve been told by a doctor that you have a weak immune system, wait 20 days.)     If you don't have symptoms: Stay home and away from others (self-isolate) until at least 10 days have passed since your first positive COVID-19 test. (Date test collected).    During this time:    Stay in your own room, including for meals. Use your own bathroom if you can.    Stay away from others in your home. No hugging, kissing or shaking hands. No visitors.     Don't go to work, school or anywhere else.     Clean  high touch  surfaces often (doorknobs, counters, handles, etc.). Use a household cleaning spray or wipes. You'll find a full list on the EPA website at www.epa.gov/pesticide-registration/list-n-disinfectants-use-against-sars-cov-2.     Cover your mouth and nose with a mask, tissue or other face covering to avoid spreading germs.    Wash your hands and face often with soap and water.    Caregivers in these groups are at risk for severe illness due to COVID-19:  o People 65 years and older  o People who live in a nursing home or long-term care facility  o People with chronic disease (lung, heart, cancer, diabetes, kidney, liver, immunologic)  o People who have a weakened immune system, including those who:  - Are in cancer treatment  - Take medicine that weakens the immune system, such as corticosteroids  - Had a bone marrow or organ transplant  - Have an immune deficiency  - Have poorly controlled HIV or AIDS  - Are obese (body mass index of 40 or higher)  - Smoke regularly    Caregivers should wear gloves while washing dishes,  handling laundry and cleaning bedrooms and bathrooms.    Wash and dry laundry with special caution. Don't shake dirty laundry, and use the warmest water setting you can.    If you have a weakened immune system, ask your doctor about other actions you should take.    For more tips, go to www.cdc.gov/coronavirus/2019-ncov/downloads/10Things.pdf.    You should not go back to work until you meet the guidelines above for ending your home isolation. You don't need to be retested for COVID-19 before going back to work--studies show that you won't spread the virus if it's been at least 10 days since your symptoms started (or 20 days, if you have a weak immune system).    Employers: This document serves as formal notice of your employee's medical guidelines for going back to work. They must meet the above guidelines before going back to work in person.    How can I take care of myself?    1. Get lots of rest. Drink extra fluids (unless a doctor has told you not to).    2. Take Tylenol (acetaminophen) for fever or pain. If you have liver or kidney problems, ask your family doctor if it's okay to take Tylenol.     Take either:     650 mg (two 325 mg pills) every 4 to 6 hours, or     1,000 mg (two 500 mg pills) every 8 hours as needed.     Note: Don't take more than 3,000 mg in one day. Acetaminophen is found in many medicines (both prescribed and over-the-counter medicines). Read all labels to be sure you don't take too much.    For children, check the Tylenol bottle for the right dose (based on their age or weight).    3. If you have other health problems (like cancer, heart failure, an organ transplant or severe kidney disease): Call your specialty clinic if you don't feel better in the next 2 days.    4. Know when to call 911: Emergency warning signs include:    Trouble breathing or shortness of breath    Pain or pressure in the chest that doesn't go away    Feeling confused like you haven't felt before, or not being able  to wake up    Bluish-colored lips or face    5. Sign up for [employer]. We know it's scary to hear that you have COVID-19. We want to track your symptoms to make sure you're okay over the next 2 weeks. Please look for an email from [employer]--this is a free, online program that we'll use to keep in touch. To sign up, follow the link in the email. Learn more at [email or web address].    Where can I get more information?    [facility]: [email or web address]    Coronavirus Basics: [email or web address]./[email or web address]/coronavirus/basics.html    What to Do If You're Sick: www.cdc.gov/coronavirus/2019-ncov/about/steps-when-sick.html    Ending Home Isolation: www.cdc.gov/coronavirus/2019-ncov/hcp/disposition-in-home-patients.html     Caring for Someone with COVID-19: www.cdc.gov/coronavirus/2019-ncov/if-you-are-sick/care-for-someone.html     [facility] clinical trials (COVID-19 research studies): clinicalaffairs.[facility]-clinical-trials     A Positive COVID-19 letter will be sent via [employer] or the Mail.  (Exception, no letters sent to Presurgerical/Preprocedure Patients)    Hue Masterson RN

## 2021-06-13 NOTE — L&D DELIVERY NOTE
DATE OF SERVICE: 2020    HISTORY:  Patient is a 25-year-old  2, para 0-0-1-0 at 39 and 2/7 weeks,  followed by me for prenatal care.  History and physical unchanged.  The patient is  gestational diabetic, on insulin.  Therefore, decision was made to go ahead with  induction at 39 weeks.    FIRST STAGE:  Patient was admitted to labor and delivery on 2020.  She was  given Cytotec for cervical ripening and then Cervidil overnight and then repeated  Cytotec on 12/15.  She then had a spontaneous rupture of membranes with clear fluid  on .  She progressed in labor.  Epidural was placed.  Labor started at 8:00  a.m. on the  and she went to complete at 1539.  Fetal heart tones were good.    SECOND STAGE:  A male infant was delivered on 2020 at 1554 over an intact  perineum with Apgars of 8 and 9.    Fetal heart tones showed variables with pushing with good return of baseline in  response to scalp stim.    THIRD STAGE:  Intact placenta delivered.  A right periurethral laceration was small,  not requiring repair.  A second-degree perineal laceration was repaired with layers  and 3-0 chromic.  Rectal sphincter intact.      ESTIMATED BLOOD LOSS:  512 mL    FINAL DIAGNOSES:  1.  Intrauterine pregnancy at term, delivered.  2.  Male infant delivered on 2020 with Apgars 8 and 9, weighing 8 pounds and 6  ounces.  3.  External fetal monitoring.  4.  Induction for gestational diabetes, on insulin.  5.  Cytotec.  6.  Cervidil.  7.  Pitocin.  8.  Epidural.  9.  Right periurethral laceration, not requiring repair.  10.  Second-degree perineal laceration, repaired.      JUAN MITCHELL MD  jn  D 2020 23:21:31  T 2020 03:39:44  R 2020 03:39:44  [id number]        cc:JUAN MITCHELL MD  [facility]

## 2021-06-16 PROBLEM — O24.414 INSULIN CONTROLLED GESTATIONAL DIABETES MELLITUS (GDM) IN THIRD TRIMESTER: Status: ACTIVE | Noted: 2020-12-10

## 2021-06-16 PROBLEM — Z34.00 ENCOUNTER FOR SUPERVISION OF NORMAL FIRST PREGNANCY: Status: ACTIVE | Noted: 2020-10-12

## 2021-06-16 PROBLEM — O26.90 PREGNANCY, COMPLICATED: Status: ACTIVE | Noted: 2020-12-16

## 2021-06-16 PROBLEM — E66.9 OBESITY: Status: ACTIVE | Noted: 2020-12-10

## 2021-06-16 PROBLEM — Z34.90 PREGNANT: Status: ACTIVE | Noted: 2020-08-11

## 2021-10-11 ENCOUNTER — HEALTH MAINTENANCE LETTER (OUTPATIENT)
Age: 26
End: 2021-10-11

## 2021-12-03 DIAGNOSIS — Z11.59 ENCOUNTER FOR SCREENING FOR OTHER VIRAL DISEASES: ICD-10-CM

## 2021-12-04 ENCOUNTER — LAB (OUTPATIENT)
Dept: LAB | Facility: CLINIC | Age: 26
End: 2021-12-04
Attending: OBSTETRICS & GYNECOLOGY
Payer: COMMERCIAL

## 2021-12-04 DIAGNOSIS — Z11.59 ENCOUNTER FOR SCREENING FOR OTHER VIRAL DISEASES: ICD-10-CM

## 2021-12-04 PROCEDURE — U0003 INFECTIOUS AGENT DETECTION BY NUCLEIC ACID (DNA OR RNA); SEVERE ACUTE RESPIRATORY SYNDROME CORONAVIRUS 2 (SARS-COV-2) (CORONAVIRUS DISEASE [COVID-19]), AMPLIFIED PROBE TECHNIQUE, MAKING USE OF HIGH THROUGHPUT TECHNOLOGIES AS DESCRIBED BY CMS-2020-01-R: HCPCS

## 2021-12-05 LAB — SARS-COV-2 RNA RESP QL NAA+PROBE: NEGATIVE

## 2021-12-06 ENCOUNTER — ANESTHESIA EVENT (OUTPATIENT)
Dept: SURGERY | Facility: CLINIC | Age: 26
End: 2021-12-06
Payer: COMMERCIAL

## 2021-12-07 ENCOUNTER — ANESTHESIA (OUTPATIENT)
Dept: SURGERY | Facility: CLINIC | Age: 26
End: 2021-12-07
Payer: COMMERCIAL

## 2021-12-07 ENCOUNTER — HOSPITAL ENCOUNTER (OUTPATIENT)
Facility: CLINIC | Age: 26
Discharge: HOME | End: 2021-12-07
Attending: OBSTETRICS & GYNECOLOGY | Admitting: OBSTETRICS & GYNECOLOGY
Payer: COMMERCIAL

## 2021-12-07 VITALS
OXYGEN SATURATION: 99 % | BODY MASS INDEX: 32.37 KG/M2 | WEIGHT: 189.6 LBS | HEART RATE: 83 BPM | TEMPERATURE: 98.8 F | SYSTOLIC BLOOD PRESSURE: 125 MMHG | DIASTOLIC BLOOD PRESSURE: 63 MMHG | HEIGHT: 64 IN | RESPIRATION RATE: 16 BRPM

## 2021-12-07 DIAGNOSIS — R52 PAIN: Primary | ICD-10-CM

## 2021-12-07 DIAGNOSIS — O26.90 PREGNANCY, COMPLICATED: ICD-10-CM

## 2021-12-07 PROCEDURE — 250N000009 HC RX 250: Performed by: OBSTETRICS & GYNECOLOGY

## 2021-12-07 PROCEDURE — 250N000013 HC RX MED GY IP 250 OP 250 PS 637: Performed by: OBSTETRICS & GYNECOLOGY

## 2021-12-07 PROCEDURE — 999N000141 HC STATISTIC PRE-PROCEDURE NURSING ASSESSMENT: Performed by: OBSTETRICS & GYNECOLOGY

## 2021-12-07 PROCEDURE — 370N000017 HC ANESTHESIA TECHNICAL FEE, PER MIN: Performed by: OBSTETRICS & GYNECOLOGY

## 2021-12-07 PROCEDURE — 258N000003 HC RX IP 258 OP 636: Performed by: NURSE ANESTHETIST, CERTIFIED REGISTERED

## 2021-12-07 PROCEDURE — 88305 TISSUE EXAM BY PATHOLOGIST: CPT | Mod: TC | Performed by: OBSTETRICS & GYNECOLOGY

## 2021-12-07 PROCEDURE — 250N000009 HC RX 250: Performed by: NURSE ANESTHETIST, CERTIFIED REGISTERED

## 2021-12-07 PROCEDURE — 250N000011 HC RX IP 250 OP 636: Performed by: NURSE ANESTHETIST, CERTIFIED REGISTERED

## 2021-12-07 PROCEDURE — 96372 THER/PROPH/DIAG INJ SC/IM: CPT | Performed by: NURSE ANESTHETIST, CERTIFIED REGISTERED

## 2021-12-07 PROCEDURE — 272N000001 HC OR GENERAL SUPPLY STERILE: Performed by: OBSTETRICS & GYNECOLOGY

## 2021-12-07 PROCEDURE — 360N000075 HC SURGERY LEVEL 2, PER MIN: Performed by: OBSTETRICS & GYNECOLOGY

## 2021-12-07 RX ORDER — ONDANSETRON 2 MG/ML
4 INJECTION INTRAMUSCULAR; INTRAVENOUS EVERY 30 MIN PRN
Status: DISCONTINUED | OUTPATIENT
Start: 2021-12-07 | End: 2021-12-07 | Stop reason: HOSPADM

## 2021-12-07 RX ORDER — LIDOCAINE HYDROCHLORIDE 10 MG/ML
INJECTION, SOLUTION INFILTRATION; PERINEURAL PRN
Status: DISCONTINUED | OUTPATIENT
Start: 2021-12-07 | End: 2021-12-07

## 2021-12-07 RX ORDER — IBUPROFEN 800 MG/1
800 TABLET, FILM COATED ORAL EVERY 6 HOURS PRN
Qty: 30 TABLET | Refills: 0 | Status: SHIPPED | OUTPATIENT
Start: 2021-12-07 | End: 2023-04-28

## 2021-12-07 RX ORDER — CEFAZOLIN SODIUM 2 G/100ML
2 INJECTION, SOLUTION INTRAVENOUS
Status: COMPLETED | OUTPATIENT
Start: 2021-12-07 | End: 2021-12-07

## 2021-12-07 RX ORDER — IBUPROFEN 400 MG/1
800 TABLET, FILM COATED ORAL ONCE
Status: DISCONTINUED | OUTPATIENT
Start: 2021-12-07 | End: 2021-12-07 | Stop reason: HOSPADM

## 2021-12-07 RX ORDER — PROPOFOL 10 MG/ML
INJECTION, EMULSION INTRAVENOUS CONTINUOUS PRN
Status: DISCONTINUED | OUTPATIENT
Start: 2021-12-07 | End: 2021-12-07

## 2021-12-07 RX ORDER — ONDANSETRON 2 MG/ML
INJECTION INTRAMUSCULAR; INTRAVENOUS PRN
Status: DISCONTINUED | OUTPATIENT
Start: 2021-12-07 | End: 2021-12-07

## 2021-12-07 RX ORDER — ACETAMINOPHEN 325 MG/1
975 TABLET ORAL ONCE
Status: DISCONTINUED | OUTPATIENT
Start: 2021-12-07 | End: 2021-12-07 | Stop reason: HOSPADM

## 2021-12-07 RX ORDER — OXYCODONE HYDROCHLORIDE 5 MG/1
5 TABLET ORAL EVERY 4 HOURS PRN
Status: DISCONTINUED | OUTPATIENT
Start: 2021-12-07 | End: 2021-12-07 | Stop reason: HOSPADM

## 2021-12-07 RX ORDER — SODIUM CHLORIDE, SODIUM LACTATE, POTASSIUM CHLORIDE, CALCIUM CHLORIDE 600; 310; 30; 20 MG/100ML; MG/100ML; MG/100ML; MG/100ML
INJECTION, SOLUTION INTRAVENOUS CONTINUOUS
Status: DISCONTINUED | OUTPATIENT
Start: 2021-12-07 | End: 2021-12-07 | Stop reason: HOSPADM

## 2021-12-07 RX ORDER — CEPHALEXIN 500 MG/1
500 CAPSULE ORAL 2 TIMES DAILY
Qty: 10 CAPSULE | Refills: 0 | Status: SHIPPED | OUTPATIENT
Start: 2021-12-07 | End: 2021-12-12

## 2021-12-07 RX ORDER — LIDOCAINE 40 MG/G
CREAM TOPICAL
Status: DISCONTINUED | OUTPATIENT
Start: 2021-12-07 | End: 2021-12-07 | Stop reason: HOSPADM

## 2021-12-07 RX ORDER — ACETAMINOPHEN 325 MG/1
975 TABLET ORAL ONCE
Status: DISCONTINUED | OUTPATIENT
Start: 2021-12-07 | End: 2021-12-07

## 2021-12-07 RX ORDER — KETOROLAC TROMETHAMINE 30 MG/ML
INJECTION, SOLUTION INTRAMUSCULAR; INTRAVENOUS PRN
Status: DISCONTINUED | OUTPATIENT
Start: 2021-12-07 | End: 2021-12-07

## 2021-12-07 RX ORDER — ACETAMINOPHEN 325 MG/1
975 TABLET ORAL ONCE
Status: COMPLETED | OUTPATIENT
Start: 2021-12-07 | End: 2021-12-07

## 2021-12-07 RX ORDER — METHYLERGONOVINE MALEATE 0.2 MG/ML
INJECTION INTRAVENOUS PRN
Status: DISCONTINUED | OUTPATIENT
Start: 2021-12-07 | End: 2021-12-07

## 2021-12-07 RX ORDER — HYDROMORPHONE HCL IN WATER/PF 6 MG/30 ML
0.2 PATIENT CONTROLLED ANALGESIA SYRINGE INTRAVENOUS EVERY 5 MIN PRN
Status: CANCELLED | OUTPATIENT
Start: 2021-12-07

## 2021-12-07 RX ORDER — MEPERIDINE HYDROCHLORIDE 25 MG/ML
12.5 INJECTION INTRAMUSCULAR; INTRAVENOUS; SUBCUTANEOUS
Status: DISCONTINUED | OUTPATIENT
Start: 2021-12-07 | End: 2021-12-07 | Stop reason: HOSPADM

## 2021-12-07 RX ORDER — ACETAMINOPHEN 325 MG/1
975 TABLET ORAL EVERY 6 HOURS PRN
Qty: 50 TABLET | Refills: 0 | Status: SHIPPED | OUTPATIENT
Start: 2021-12-07 | End: 2023-04-28

## 2021-12-07 RX ORDER — DEXAMETHASONE SODIUM PHOSPHATE 10 MG/ML
INJECTION, SOLUTION INTRAMUSCULAR; INTRAVENOUS PRN
Status: DISCONTINUED | OUTPATIENT
Start: 2021-12-07 | End: 2021-12-07

## 2021-12-07 RX ORDER — CEPHALEXIN 500 MG/1
500 CAPSULE ORAL EVERY 12 HOURS SCHEDULED
Status: DISCONTINUED | OUTPATIENT
Start: 2021-12-07 | End: 2021-12-07 | Stop reason: HOSPADM

## 2021-12-07 RX ORDER — FENTANYL CITRATE 50 UG/ML
INJECTION, SOLUTION INTRAMUSCULAR; INTRAVENOUS PRN
Status: DISCONTINUED | OUTPATIENT
Start: 2021-12-07 | End: 2021-12-07

## 2021-12-07 RX ORDER — ONDANSETRON 4 MG/1
4 TABLET, ORALLY DISINTEGRATING ORAL EVERY 30 MIN PRN
Status: DISCONTINUED | OUTPATIENT
Start: 2021-12-07 | End: 2021-12-07 | Stop reason: HOSPADM

## 2021-12-07 RX ORDER — LIDOCAINE HYDROCHLORIDE 10 MG/ML
INJECTION, SOLUTION INFILTRATION; PERINEURAL PRN
Status: DISCONTINUED | OUTPATIENT
Start: 2021-12-07 | End: 2021-12-07 | Stop reason: HOSPADM

## 2021-12-07 RX ORDER — SODIUM CHLORIDE, SODIUM LACTATE, POTASSIUM CHLORIDE, CALCIUM CHLORIDE 600; 310; 30; 20 MG/100ML; MG/100ML; MG/100ML; MG/100ML
INJECTION, SOLUTION INTRAVENOUS CONTINUOUS PRN
Status: DISCONTINUED | OUTPATIENT
Start: 2021-12-07 | End: 2021-12-07

## 2021-12-07 RX ORDER — FENTANYL CITRATE 50 UG/ML
25 INJECTION, SOLUTION INTRAMUSCULAR; INTRAVENOUS
Status: DISCONTINUED | OUTPATIENT
Start: 2021-12-07 | End: 2021-12-07 | Stop reason: HOSPADM

## 2021-12-07 RX ORDER — FENTANYL CITRATE 50 UG/ML
25 INJECTION, SOLUTION INTRAMUSCULAR; INTRAVENOUS EVERY 5 MIN PRN
Status: CANCELLED | OUTPATIENT
Start: 2021-12-07

## 2021-12-07 RX ADMIN — CEFAZOLIN SODIUM 2 G: 2 INJECTION, SOLUTION INTRAVENOUS at 08:23

## 2021-12-07 RX ADMIN — KETOROLAC TROMETHAMINE 30 MG: 30 INJECTION, SOLUTION INTRAMUSCULAR at 08:43

## 2021-12-07 RX ADMIN — ACETAMINOPHEN 975 MG: 325 TABLET ORAL at 09:58

## 2021-12-07 RX ADMIN — LIDOCAINE HYDROCHLORIDE 5 ML: 10 INJECTION, SOLUTION INFILTRATION; PERINEURAL at 08:17

## 2021-12-07 RX ADMIN — DEXAMETHASONE SODIUM PHOSPHATE 10 MG: 10 INJECTION, SOLUTION INTRAMUSCULAR; INTRAVENOUS at 08:24

## 2021-12-07 RX ADMIN — SODIUM CHLORIDE, POTASSIUM CHLORIDE, SODIUM LACTATE AND CALCIUM CHLORIDE: 600; 310; 30; 20 INJECTION, SOLUTION INTRAVENOUS at 08:14

## 2021-12-07 RX ADMIN — FENTANYL CITRATE 100 MCG: 50 INJECTION, SOLUTION INTRAMUSCULAR; INTRAVENOUS at 08:16

## 2021-12-07 RX ADMIN — ONDANSETRON 4 MG: 2 INJECTION INTRAMUSCULAR; INTRAVENOUS at 08:24

## 2021-12-07 RX ADMIN — MIDAZOLAM 2 MG: 1 INJECTION INTRAMUSCULAR; INTRAVENOUS at 08:16

## 2021-12-07 RX ADMIN — METHYLERGONOVINE MALEATE 200 MCG: 0.2 INJECTION INTRAMUSCULAR; INTRAVENOUS at 08:42

## 2021-12-07 RX ADMIN — PROPOFOL 100 MCG/KG/MIN: 10 INJECTION, EMULSION INTRAVENOUS at 08:18

## 2021-12-07 ASSESSMENT — MIFFLIN-ST. JEOR: SCORE: 1580.02

## 2021-12-07 NOTE — DISCHARGE INSTRUCTIONS
Discharge Instructions: After Your Surgery  You ve just had surgery. During surgery, you were given medicine called anesthesia to keep you relaxed and free of pain. After surgery, you may have some pain or nausea. This is common. Here are some tips for feeling better and getting well after surgery.     Stay on schedule with your medicine.   Going home  Your healthcare provider will show you how to take care of yourself when you go home. He or she will also answer your questions. Have an adult family member or friend drive you home. For the first 24 hours after your surgery:    Don't drive or use heavy equipment.    Don't make important decisions or sign legal papers.    Don't drink alcohol.    Have someone stay with you. He or she can watch for problems and help keep you safe.  Be sure to go to all follow-up visits with your healthcare provider. And rest after your surgery for as long as your healthcare provider tells you to.  Coping with pain  If you have pain after surgery, pain medicine will help you feel better. Take it as told, before pain becomes severe. Also, ask your healthcare provider or pharmacist about other ways to control pain. This might be with heat, ice, or relaxation. And follow any other instructions your surgeon or nurse gives you.  Tips for taking pain medicine  To get the best relief possible, remember these points:    Pain medicines can upset your stomach. Taking them with a little food may help.    Most pain relievers taken by mouth need at least 20 to 30 minutes to start to work.    Don't wait till your pain becomes severe before you take your medicine. Try to time your medicine so that you can take it before starting an activity. This might be before you get dressed, go for a walk, or sit down for dinner.    Constipation is a common side effect of pain medicines. Call your healthcare provider before taking any medicines such as laxatives or stool softeners to help ease constipation. Also  ask if you should skip any foods. Drinking lots of fluids and eating foods such as fruits and vegetables that are high in fiber can also help. Remember, don't take laxatives unless your surgeon has prescribed them.    Drinking alcohol and taking pain medicine can cause dizziness and slow your breathing. It can even be deadly. Don't drink alcohol while taking pain medicine.    Pain medicine can make you react more slowly to things. Don't drive or run machinery while taking pain medicine.  Your healthcare provider may tell you to take acetaminophen to help ease your pain. Ask him or her how much you are supposed to take each day. Acetaminophen or other pain relievers may interact with your prescription medicines or other over-the-counter (OTC) medicines. Some prescription medicines have acetaminophen and other ingredients. Using both prescription and OTC acetaminophen for pain can cause you to overdose. Read the labels on your OTC medicines with care. This will help you to clearly know the list of ingredients, how much to take, and any warnings. It may also help you not take too much acetaminophen. If you have questions or don't understand the information, ask your pharmacist or healthcare provider to explain it to you before you take the OTC medicine.  Managing nausea  Some people have an upset stomach after surgery. This is often because of anesthesia, pain, or pain medicine, or the stress of surgery. These tips will help you handle nausea and eat healthy foods as you get better. If you were on a special food plan before surgery, ask your healthcare provider if you should follow it while you get better. These tips may help:    Don't push yourself to eat. Your body will tell you when to eat and how much.    Start off with clear liquids and soup. They are easier to digest.    Next try semi-solid foods, such as mashed potatoes, applesauce, and gelatin, as you feel ready.    Slowly move to solid foods. Don t eat fatty,  rich, or spicy foods at first.    Don't force yourself to have 3 large meals a day. Instead eat smaller amounts more often.    Take pain medicines with a small amount of solid food, such as crackers or toast, to prevent nausea.  When to call your healthcare provider  Call your healthcare provider if:    You still have intolerable pain an hour after taking medicine. The medicine may not be strong enough.    You feel too sleepy, dizzy, or groggy. The medicine may be too strong.    You have side effects such as nausea or vomiting, or skin changes such as rash, itching, or hives. Your healthcare provider may suggest other medicines to control side effects.  Rash, itching, or hives may mean you have an allergic reaction. Report this right away. If you have trouble breathing or facial swelling, call 911 right away.  If you have obstructive sleep apnea  You were given anesthesia medicine during surgery to keep you comfortable and free of pain. After surgery, you may have more apnea spells because of this medicine and other medicines you were given. The spells may last longer than usual.   At home:    Keep using the continuous positive airway pressure (CPAP) device when you sleep. Unless your healthcare provider tells you not to, use it when you sleep, day or night. CPAP is a common device used to treat obstructive sleep apnea.    Talk with your provider before taking any pain medicine, muscle relaxants, or sedatives. Your provider will tell you about the possible dangers of taking these medicines.  [employer] last reviewed this educational content on 3/1/2019    0844-4434 [employer]. All rights reserved. This information is not intended as a substitute for professional medical care. Always follow your healthcare professional's instructions.

## 2021-12-07 NOTE — ANESTHESIA POSTPROCEDURE EVALUATION
Patient: Winsome Edwards    Procedure: Procedure(s):  SUCTION DILATION AND CURETTAGE       Diagnosis:Missed  [O02.1]  Diagnosis Additional Information: No value filed.    Anesthesia Type:  MAC    Note:  Disposition: Outpatient   Postop Pain Control: Uneventful            Sign Out: Well controlled pain   PONV: No   Neuro/Psych: Uneventful            Sign Out: Acceptable/Baseline neuro status   Airway/Respiratory: Uneventful            Sign Out: Acceptable/Baseline resp. status   CV/Hemodynamics: Uneventful            Sign Out: Acceptable CV status; No obvious hypovolemia; No obvious fluid overload   Other NRE: NONE   DID A NON-ROUTINE EVENT OCCUR? No           Last vitals:  Vitals Value Taken Time   /63 21 1000   Temp 37.1  C (98.8  F) 21 0853   Pulse 88 21 1006   Resp 16 21 0911   SpO2 99 % 21 1007   Vitals shown include unvalidated device data.    Electronically Signed By: Chalo Clement MD  2021  1:09 PM

## 2021-12-07 NOTE — ANESTHESIA CARE TRANSFER NOTE
Patient: Winsome Edwards    Procedure: Procedure(s):  SUCTION DILATION AND CURETTAGE       Diagnosis: Missed  [O02.1]  Diagnosis Additional Information: No value filed.    Anesthesia Type:   MAC     Note:    Oropharynx: oropharynx clear of all foreign objects  Level of Consciousness: awake  Oxygen Supplementation: room air    Independent Airway: airway patency satisfactory and stable  Dentition: dentition unchanged  Vital Signs Stable: post-procedure vital signs reviewed and stable  Report to RN Given: handoff report given  Patient transferred to: Phase II    Handoff Report: Identifed the Patient, Identified the Reponsible Provider, Reviewed the pertinent medical history, Discussed the surgical course, Reviewed Intra-OP anesthesia mangement and issues during anesthesia, Set expectations for post-procedure period and Allowed opportunity for questions and acknowledgement of understanding      Vitals:  Vitals Value Taken Time   BP     Temp     Pulse     Resp     SpO2         Electronically Signed By: ALMA ROSA Marr CRNA  2021  8:56 AM

## 2021-12-07 NOTE — OP NOTE
Procedure Date: 2021    PREOPERATIVE DIAGNOSIS:  Incomplete .    POSTOPERATIVE DIAGNOSIS:  Incomplete .    PROCEDURE:  Suction curettage.    HISTORY:  The patient is a 26-year-old  3, para 1-0-1-1, who had an ultrasound previously, which showed a fetal demise.  She had heavy bleeding over the weekend.  Ultrasound yesterday showed no sac, but a 15 mm lining.  She is Rh positive.  Risks, benefits, and alternatives reviewed, questions answered and suction curettage agreed upon.    DESCRIPTION OF PROCEDURE:  The patient was taken to the operating room under MAC placed in the dorsal lithotomy position, prepped and draped in the usual manner.  A timeout was undertaken.  Ethel speculum was placed and single tooth tenaculum was placed horizontally on the anterior lip of the cervix.  There was a clot at the cervix.  This was removed approximately 8 mL of 1% lidocaine plain was instilled as a paracervical block.  The cervix was open to 8.  A #7 curette was used with a moderate amount of tissue and some fluid.  The uterine cavity was then gently curetted appeared to be clean was suctioned again with just minimal blood and no further tissue.  Methergine was given 0.2 IM.  Tenaculum removed, minimal bleeding.  Ethel removed and patient transferred to [facility] in stable condition, 8 mL of 1% lidocaine., 5 mL I did drain her bladder preop with a large amount of urine.    SURGEON:  Giuliana Black MD.    COMPLICATIONS:  None.    ANESTHESIA:  MAC.    Giuliana Black MD        D: 2021   T: 2021   MT: sd    Name:     BENY BAIRES  MRN:      [id number]        Account:        [id number]   :      1995           Procedure Date: 2021     Document: [id number]    cc:  [facility]

## 2021-12-07 NOTE — ANESTHESIA PREPROCEDURE EVALUATION
Anesthesia Pre-Procedure Evaluation    Patient: Winsome Edwards   MRN: [id number] : 1995        Preoperative Diagnosis: Missed  [O02.1]    Procedure : Procedure(s):  SUCTION DILATION AND CURETTAGE          Past Medical History:   Diagnosis Date     Chronic pain      Migraine       Past Surgical History:   Procedure Laterality Date     D & C       KNEE SURGERY Left      OTHER SURGICAL HISTORY Left     BENIGN TUMER REMOVAL LEFT LEG.      Allergies   Allergen Reactions     Vancomycin Other (See Comments)     Lidya syndrome     Adhesive Tape-Silicones [Adhesive Tape] Rash     Localized rash, sesitive skin      Social History     Tobacco Use     Smoking status: Never Smoker     Smokeless tobacco: Never Used   Substance Use Topics     Alcohol use: Not Currently      Wt Readings from Last 1 Encounters:   21 86 kg (189 lb 9.6 oz)        Anesthesia Evaluation            ROS/MED HX  ENT/Pulmonary:  - neg pulmonary ROS     Neurologic:  - neg neurologic ROS     Cardiovascular:  - neg cardiovascular ROS     METS/Exercise Tolerance:     Hematologic:  - neg hematologic  ROS     Musculoskeletal:  - neg musculoskeletal ROS     GI/Hepatic:  - neg GI/hepatic ROS     Renal/Genitourinary:       Endo:     (+) type I DM, Obesity,     Psychiatric/Substance Use:  - neg psychiatric ROS     Infectious Disease:       Malignancy:       Other:            Physical Exam    Airway  airway exam normal      Mallampati: I   TM distance: > 3 FB   Neck ROM: full   Mouth opening: > 3 cm    Respiratory Devices and Support         Dental  no notable dental history         Cardiovascular   cardiovascular exam normal       Rhythm and rate: regular and normal     Pulmonary   pulmonary exam normal        breath sounds clear to auscultation           OUTSIDE LABS:  CBC:   Lab Results   Component Value Date     2020     BMP:   Lab Results   Component Value Date    GLC 95 2020    GLC 83 2020     COAGS: No results  found for: PTT, INR, FIBR  POC: No results found for: BGM, HCG, HCGS  HEPATIC: No results found for: ALBUMIN, PROTTOTAL, ALT, AST, GGT, ALKPHOS, BILITOTAL, BILIDIRECT, JOSSIE  OTHER:   Lab Results   Component Value Date    A1C 4.8 05/21/2020       Anesthesia Plan    ASA Status:  2      Anesthesia Type: MAC.              Consents            Postoperative Care    Pain management: Oral pain medications.   PONV prophylaxis: Ondansetron (or other 5HT-3), Dexamethasone or Solumedrol     Comments:                Chalo Clement MD

## 2021-12-08 LAB
PATH REPORT.COMMENTS IMP SPEC: NORMAL
PATH REPORT.COMMENTS IMP SPEC: NORMAL
PATH REPORT.FINAL DX SPEC: NORMAL
PATH REPORT.GROSS SPEC: NORMAL
PATH REPORT.MICROSCOPIC SPEC OTHER STN: NORMAL
PATH REPORT.RELEVANT HX SPEC: NORMAL
PHOTO IMAGE: NORMAL

## 2021-12-08 PROCEDURE — 88305 TISSUE EXAM BY PATHOLOGIST: CPT | Mod: 26 | Performed by: PATHOLOGY

## 2022-01-30 ENCOUNTER — HEALTH MAINTENANCE LETTER (OUTPATIENT)
Age: 27
End: 2022-01-30

## 2022-09-24 ENCOUNTER — HEALTH MAINTENANCE LETTER (OUTPATIENT)
Age: 27
End: 2022-09-24

## 2023-03-07 ENCOUNTER — LAB REQUISITION (OUTPATIENT)
Dept: LAB | Facility: CLINIC | Age: 28
End: 2023-03-07

## 2023-03-07 DIAGNOSIS — Z34.91 ENCOUNTER FOR SUPERVISION OF NORMAL PREGNANCY, UNSPECIFIED, FIRST TRIMESTER: ICD-10-CM

## 2023-03-07 DIAGNOSIS — Z86.32 PERSONAL HISTORY OF GESTATIONAL DIABETES: ICD-10-CM

## 2023-03-07 LAB
BASOPHILS # BLD AUTO: 0 10E3/UL (ref 0–0.2)
BASOPHILS NFR BLD AUTO: 0 %
EOSINOPHIL # BLD AUTO: 0.5 10E3/UL (ref 0–0.7)
EOSINOPHIL NFR BLD AUTO: 5 %
ERYTHROCYTE [DISTWIDTH] IN BLOOD BY AUTOMATED COUNT: 14 % (ref 10–15)
HCT VFR BLD AUTO: 42.8 % (ref 35–47)
HGB BLD-MCNC: 13.5 G/DL (ref 11.7–15.7)
IMM GRANULOCYTES # BLD: 0.1 10E3/UL
IMM GRANULOCYTES NFR BLD: 1 %
LYMPHOCYTES # BLD AUTO: 2.2 10E3/UL (ref 0.8–5.3)
LYMPHOCYTES NFR BLD AUTO: 23 %
MCH RBC QN AUTO: 28.5 PG (ref 26.5–33)
MCHC RBC AUTO-ENTMCNC: 31.5 G/DL (ref 31.5–36.5)
MCV RBC AUTO: 91 FL (ref 78–100)
MONOCYTES # BLD AUTO: 0.6 10E3/UL (ref 0–1.3)
MONOCYTES NFR BLD AUTO: 6 %
NEUTROPHILS # BLD AUTO: 6.3 10E3/UL (ref 1.6–8.3)
NEUTROPHILS NFR BLD AUTO: 65 %
NRBC # BLD AUTO: 0 10E3/UL
NRBC BLD AUTO-RTO: 0 /100
PLATELET # BLD AUTO: 281 10E3/UL (ref 150–450)
RBC # BLD AUTO: 4.73 10E6/UL (ref 3.8–5.2)
WBC # BLD AUTO: 9.6 10E3/UL (ref 4–11)

## 2023-03-07 PROCEDURE — 85025 COMPLETE CBC W/AUTO DIFF WBC: CPT | Performed by: PHYSICIAN ASSISTANT

## 2023-03-07 PROCEDURE — 86762 RUBELLA ANTIBODY: CPT | Performed by: PHYSICIAN ASSISTANT

## 2023-03-07 PROCEDURE — 87086 URINE CULTURE/COLONY COUNT: CPT | Performed by: PHYSICIAN ASSISTANT

## 2023-03-07 PROCEDURE — 86747 PARVOVIRUS ANTIBODY: CPT | Performed by: PHYSICIAN ASSISTANT

## 2023-03-07 PROCEDURE — 86850 RBC ANTIBODY SCREEN: CPT | Performed by: PHYSICIAN ASSISTANT

## 2023-03-07 PROCEDURE — 86803 HEPATITIS C AB TEST: CPT | Performed by: PHYSICIAN ASSISTANT

## 2023-03-07 PROCEDURE — 86901 BLOOD TYPING SEROLOGIC RH(D): CPT | Performed by: PHYSICIAN ASSISTANT

## 2023-03-07 PROCEDURE — 87340 HEPATITIS B SURFACE AG IA: CPT | Performed by: PHYSICIAN ASSISTANT

## 2023-03-07 PROCEDURE — 87389 HIV-1 AG W/HIV-1&-2 AB AG IA: CPT | Performed by: PHYSICIAN ASSISTANT

## 2023-03-07 PROCEDURE — 83036 HEMOGLOBIN GLYCOSYLATED A1C: CPT | Performed by: PHYSICIAN ASSISTANT

## 2023-03-07 PROCEDURE — 86780 TREPONEMA PALLIDUM: CPT | Performed by: PHYSICIAN ASSISTANT

## 2023-03-07 PROCEDURE — 81403 MOPATH PROCEDURE LEVEL 4: CPT | Performed by: PHYSICIAN ASSISTANT

## 2023-03-08 LAB
ABO/RH TYPE: NORMAL
ANTIBODY SCREEN: NEGATIVE
HBA1C MFR BLD: 5.4 %
HBV SURFACE AG SERPL QL IA: NONREACTIVE
HCV AB SERPL QL IA: NONREACTIVE
HIV 1+2 AB+HIV1 P24 AG SERPL QL IA: NONREACTIVE
RUBV IGG SERPL QL IA: 12.9 INDEX
RUBV IGG SERPL QL IA: POSITIVE
SPECIMEN EXPIRATION DATE: NORMAL
SPECIMEN EXPIRATION DATE: NORMAL
T PALLIDUM AB SER QL: NONREACTIVE

## 2023-03-09 LAB — BACTERIA UR CULT: NORMAL

## 2023-03-10 LAB
B19V IGG SER IA-ACNC: 7.22 IV
B19V IGM SER IA-ACNC: 0.16 IV

## 2023-03-21 ENCOUNTER — LAB REQUISITION (OUTPATIENT)
Dept: LAB | Facility: CLINIC | Age: 28
End: 2023-03-21

## 2023-03-21 DIAGNOSIS — Z34.91 ENCOUNTER FOR SUPERVISION OF NORMAL PREGNANCY, UNSPECIFIED, FIRST TRIMESTER: ICD-10-CM

## 2023-03-21 LAB — SCANNED LAB RESULT: NORMAL

## 2023-03-21 PROCEDURE — G0145 SCR C/V CYTO,THINLAYER,RESCR: HCPCS | Performed by: OBSTETRICS & GYNECOLOGY

## 2023-03-24 LAB
BKR LAB AP GYN ADEQUACY: NORMAL
BKR LAB AP GYN INTERPRETATION: NORMAL
BKR LAB AP HPV REFLEX: NORMAL
BKR LAB AP LMP: NORMAL
BKR LAB AP PREVIOUS ABNL DX: NORMAL
BKR LAB AP PREVIOUS ABNORMAL: NORMAL
PATH REPORT.COMMENTS IMP SPEC: NORMAL
PATH REPORT.COMMENTS IMP SPEC: NORMAL
PATH REPORT.RELEVANT HX SPEC: NORMAL

## 2023-04-03 ENCOUNTER — MEDICAL CORRESPONDENCE (OUTPATIENT)
Dept: HEALTH INFORMATION MANAGEMENT | Facility: CLINIC | Age: 28
End: 2023-04-03
Payer: COMMERCIAL

## 2023-04-03 ENCOUNTER — TRANSFERRED RECORDS (OUTPATIENT)
Dept: HEALTH INFORMATION MANAGEMENT | Facility: CLINIC | Age: 28
End: 2023-04-03
Payer: COMMERCIAL

## 2023-04-19 ENCOUNTER — VIRTUAL VISIT (OUTPATIENT)
Dept: EDUCATION SERVICES | Facility: CLINIC | Age: 28
End: 2023-04-19
Payer: COMMERCIAL

## 2023-04-19 ENCOUNTER — TELEPHONE (OUTPATIENT)
Dept: EDUCATION SERVICES | Facility: CLINIC | Age: 28
End: 2023-04-19

## 2023-04-19 DIAGNOSIS — O24.419 GDM (GESTATIONAL DIABETES MELLITUS): Primary | ICD-10-CM

## 2023-04-19 PROCEDURE — G0108 DIAB MANAGE TRN  PER INDIV: HCPCS | Mod: 95 | Performed by: DIETITIAN, REGISTERED

## 2023-04-19 RX ORDER — BLOOD-GLUCOSE METER
1 EACH MISCELLANEOUS PRN
Qty: 1 KIT | Refills: 0 | Status: SHIPPED | OUTPATIENT
Start: 2023-04-19

## 2023-04-19 RX ORDER — LANCETS
EACH MISCELLANEOUS
Qty: 100 EACH | Refills: 8 | Status: SHIPPED | OUTPATIENT
Start: 2023-04-19

## 2023-04-19 RX ORDER — BLOOD SUGAR DIAGNOSTIC
STRIP MISCELLANEOUS
Qty: 100 STRIP | Refills: 8 | Status: SHIPPED | OUTPATIENT
Start: 2023-04-19

## 2023-04-19 NOTE — TELEPHONE ENCOUNTER
Duplicate - please disregard    Thanks  Janneth Aguilera RDN, LD, CDCES  Diabetes Care and Education

## 2023-04-19 NOTE — PROGRESS NOTES
Diabetes Self-Management Education & Support  Type of Service: Telephone Visit    SUBJECTIVE/OBJECTIVE:  Presents for education related to gestational diabetes.    Accompanied by: Self  Gestational weeks: 15w  Hospital planned for delivery: [facility] Next OB Visit Date: 23  Number of previous pregnancies: 4 (miscarried 3). Has a 2.4 y/o son  Had any babies over 9 lbs: No  Previously had Gestational Diabetes: Yes (had GDM with her 2 1/2 year old son)  Had Diabetes Education before: Yes  Previous insulin or other diabetes medication during that pregnancy: Yes (took insulin the last week of her pregnancy)  Have you ever had thyroid problems or taken thyroid medication?: No  Heart disease, mitral valve prolapse or rheumatic fever?: No  Hypertension : No  High Cholesterol: No  High Triglycerides: No  Do you use tobacco products?: No  Do you drink beer, wine or hard liquor?: No    Cultural Influences/Ethnic Background:  Not  or     Estimated Date of Delivery: Data Unavailable    1 hour OGTT  No results found for: GLU1      3 hour OGTT    Fasting  No results found for: GTTGF    1 hour  No results found for: GTTG1    2 hour  No results found for: GTTG2    3 hour  No results found for: GTTG3    Lifestyle and Health Behaviors:  Pre-pregnancy weight (lbs): 190  Exercise:: Currently not exercising  Barrier to exercise: Other (weather)  Meals include: Breakfast, Lunch, Dinner, Morning Snack, Afternoon Snack  Breakfast: waffle with cream cheese, water  Lunch: deli meat sandwich, veg, water  Dinner: whole wheat burrito  Snacks: crackers with cheese  Beverages: Water  Biggest challenges to healthy eating: Portion control  Pre-roly vitamin?: Yes  List supplements currently taking: B6, D3  Experiencing nausea?: No  Experiencing heartburn?: No    Healthy Coping:  Emotional response to diabetes: Acceptance  Informal Support system:: Spouse, Parent, Children  Stage of change: PREPARATION (Decided to change -  considering how)    Current Management:  Diet and activity     ASSESSMENT:    Initial GDM visit  Pt's 5th pregnancy. Has a 2.6y/o son. Has miscarried 3  It's a girl    Hx GDM with her son in 2020 - was started on insulin the last week of pregnancy    Is a  for a pharmacy- works full time    Reports good support - , parents    INTERVENTION:  Rx for glucose meter, testing supplies and ketostix was sent in - pt feels comfortable with testing BG 4x/d and daily ketones    Educational topics covered today:  GDM diagnosis, pathophysiology, Risks and Complications of GDM, Means of controlling GDM, Using a Blood Glucose Monitor, Blood Glucose Goals, Logging and Interpreting Glucose Results, Ketone Testing, When to Call a Diabetes Educator or OB Provider, Healthy Eating During Pregnancy, Counting Carbohydrates, Meal Planning for GDM, and Physical Activity    Educational materials provided today:     Emailed with permission:  Danitza Understanding Gestational Diabetes  GDM Log Book  Healthy snack list     Pt verbalized understanding of concepts discussed and recommendations provided today.     PLAN:    Check glucose 4 times daily, before breakfast and 1 hour after each meal.     Check Ketones daily for one week, if negative, reduce testing to once a week.     Physical activity recommended: at least 30 mins daily as able/safe. Staying active for 10-15 mins after meals/snacks helps lower BG.    Meal plan: 30-45 carbs at breakfast, 45-60 carbs at lunch, 45-60 carbs at supper, 15-30 carbs at 3 snacks a day.  Follow consistent CHO meal plan, eat CHO and protein/fat at all meals/snacks.    Call/e-mail/MyChart message diabetes educator if 3 or more blood sugars are above the goal in 1 week, if ketones are positive, or with questions/concerns.    F/u:have requested our team schedulers to call patient for GDM f/u for next week  OBGYN:4/25  Instructed pt to always bring their meter, BG log to all clinic  visits - pt expressed understanding    Time Spent: 60 minutes  Encounter Type: Individual    Any diabetes medication dose changes were made via the CDE Protocol and Collaborative Practice Agreement with the patient's referring provider. A copy of this encounter was shared with the provider.

## 2023-04-19 NOTE — TELEPHONE ENCOUNTER
Please call patient for their follow up GDM appointment for next week with any available educator.    Thanks   ALVARO Morgan RDN, [facility]  Diabetes Care and Education

## 2023-04-19 NOTE — LETTER
2023         RE: Winsome Edwards  [address] [facility] [address]        Dear Colleague,    Thank you for referring your patient, Winsome Edwards, to the [facility]. Please see a copy of my visit note below.    Diabetes Self-Management Education & Support  Type of Service: Telephone Visit    SUBJECTIVE/OBJECTIVE:  Presents for education related to gestational diabetes.    Accompanied by: Self  Gestational weeks: 15w  Hospital planned for delivery: [facility] Next OB Visit Date: 23  Number of previous pregnancies: 4 (miscarried 3). Has a 2.6 y/o son  Had any babies over 9 lbs: No  Previously had Gestational Diabetes: Yes (had GDM with her 2 1/2 year old son)  Had Diabetes Education before: Yes  Previous insulin or other diabetes medication during that pregnancy: Yes (took insulin the last week of her pregnancy)  Have you ever had thyroid problems or taken thyroid medication?: No  Heart disease, mitral valve prolapse or rheumatic fever?: No  Hypertension : No  High Cholesterol: No  High Triglycerides: No  Do you use tobacco products?: No  Do you drink beer, wine or hard liquor?: No    Cultural Influences/Ethnic Background:  Not  or     Estimated Date of Delivery: Data Unavailable    1 hour OGTT  No results found for: GLU1      3 hour OGTT    Fasting  No results found for: GTTGF    1 hour  No results found for: GTTG1    2 hour  No results found for: GTTG2    3 hour  No results found for: GTTG3    Lifestyle and Health Behaviors:  Pre-pregnancy weight (lbs): 190  Exercise:: Currently not exercising  Barrier to exercise: Other (weather)  Meals include: Breakfast, Lunch, Dinner, Morning Snack, Afternoon Snack  Breakfast: waffle with cream cheese, water  Lunch: deli meat sandwich, veg, water  Dinner: whole wheat burrito  Snacks: crackers with cheese  Beverages: Water  Biggest challenges to healthy eating: Portion control  Pre-roly vitamin?: Yes  List supplements  currently taking: B6, D3  Experiencing nausea?: No  Experiencing heartburn?: No    Healthy Coping:  Emotional response to diabetes: Acceptance  Informal Support system:: Spouse, Parent, Children  Stage of change: PREPARATION (Decided to change - considering how)    Current Management:  Diet and activity     ASSESSMENT:    Initial GDM visit  Pt's 5th pregnancy. Has a 2.6y/o son. Has miscarried 3  It's a girl    Hx GDM with her son in 2020 - was started on insulin the last week of pregnancy    Is a  for a pharmacy- works full time    Reports good support - , parents    INTERVENTION:  Rx for glucose meter, testing supplies and ketostix was sent in - pt feels comfortable with testing BG 4x/d and daily ketones    Educational topics covered today:  GDM diagnosis, pathophysiology, Risks and Complications of GDM, Means of controlling GDM, Using a Blood Glucose Monitor, Blood Glucose Goals, Logging and Interpreting Glucose Results, Ketone Testing, When to Call a Diabetes Educator or OB Provider, Healthy Eating During Pregnancy, Counting Carbohydrates, Meal Planning for GDM, and Physical Activity    Educational materials provided today:     Emailed with permission:  Danitza Understanding Gestational Diabetes  GDM Log Book  Healthy snack list     Pt verbalized understanding of concepts discussed and recommendations provided today.     PLAN:    Check glucose 4 times daily, before breakfast and 1 hour after each meal.     Check Ketones daily for one week, if negative, reduce testing to once a week.     Physical activity recommended: at least 30 mins daily as able/safe. Staying active for 10-15 mins after meals/snacks helps lower BG.    Meal plan: 30-45 carbs at breakfast, 45-60 carbs at lunch, 45-60 carbs at supper, 15-30 carbs at 3 snacks a day.  Follow consistent CHO meal plan, eat CHO and protein/fat at all meals/snacks.    Call/e-mail/[employer] message diabetes educator if 3 or more blood sugars  are above the goal in 1 week, if ketones are positive, or with questions/concerns.    F/u:have requested our team schedulers to call patient for GDM f/u for next week  OBGYN:4/25  Instructed pt to always bring their meter, BG log to all clinic visits - pt expressed understanding    Time Spent: 60 minutes  Encounter Type: Individual    Any diabetes medication dose changes were made via the CDE Protocol and Collaborative Practice Agreement with the patient's referring provider. A copy of this encounter was shared with the provider.

## 2023-04-25 ENCOUNTER — LAB REQUISITION (OUTPATIENT)
Dept: LAB | Facility: CLINIC | Age: 28
End: 2023-04-25

## 2023-04-25 DIAGNOSIS — O24.419 GESTATIONAL DIABETES MELLITUS IN PREGNANCY, UNSPECIFIED CONTROL: ICD-10-CM

## 2023-04-25 DIAGNOSIS — Z3A.16 16 WEEKS GESTATION OF PREGNANCY: ICD-10-CM

## 2023-04-25 LAB — HBA1C MFR BLD: 5.3 %

## 2023-04-25 PROCEDURE — 83036 HEMOGLOBIN GLYCOSYLATED A1C: CPT | Performed by: OBSTETRICS & GYNECOLOGY

## 2023-04-25 PROCEDURE — 82105 ALPHA-FETOPROTEIN SERUM: CPT | Performed by: OBSTETRICS & GYNECOLOGY

## 2023-04-28 ENCOUNTER — TELEPHONE (OUTPATIENT)
Dept: EDUCATION SERVICES | Facility: CLINIC | Age: 28
End: 2023-04-28

## 2023-04-28 ENCOUNTER — VIRTUAL VISIT (OUTPATIENT)
Dept: EDUCATION SERVICES | Facility: CLINIC | Age: 28
End: 2023-04-28
Payer: COMMERCIAL

## 2023-04-28 DIAGNOSIS — O24.419 GDM (GESTATIONAL DIABETES MELLITUS): Primary | ICD-10-CM

## 2023-04-28 DIAGNOSIS — O24.414 INSULIN CONTROLLED GESTATIONAL DIABETES MELLITUS (GDM) DURING PREGNANCY, ANTEPARTUM: Primary | ICD-10-CM

## 2023-04-28 LAB
# FETUSES US: NORMAL
AFP MOM SERPL: 1.37
AFP SERPL-MCNC: 32 NG/ML
AGE - REPORTED: 29.3 YR
CURRENT SMOKER: NO
FAMILY MEMBER DISEASES HX: NO
GA METHOD: NORMAL
GA: NORMAL WK
IDDM PATIENT QL: YES
INTEGRATED SCN PATIENT-IMP: NORMAL
SPECIMEN DRAWN SERPL: NORMAL

## 2023-04-28 PROCEDURE — 98967 PH1 ASSMT&MGMT NQHP 11-20: CPT | Mod: 95 | Performed by: DIETITIAN, REGISTERED

## 2023-04-28 RX ORDER — INSULIN HUMAN 100 [IU]/ML
8 INJECTION, SUSPENSION SUBCUTANEOUS AT BEDTIME
Qty: 15 ML | Refills: 6 | Status: ON HOLD | OUTPATIENT
Start: 2023-04-28 | End: 2023-10-03

## 2023-04-28 RX ORDER — PEN NEEDLE, DIABETIC 30 GX3/16"
1 NEEDLE, DISPOSABLE MISCELLANEOUS DAILY
Qty: 100 EACH | Refills: 3 | Status: SHIPPED | OUTPATIENT
Start: 2023-04-28 | End: 2023-07-13

## 2023-04-28 RX ORDER — BLOOD PRESSURE TEST KIT
1 KIT MISCELLANEOUS DAILY
Qty: 100 EACH | Refills: 3 | Status: SHIPPED | OUTPATIENT
Start: 2023-04-28

## 2023-04-28 NOTE — TELEPHONE ENCOUNTER
Please reach out to theresa Schumacher in the pregnancy clinic for the week of May 1st.     Thank you,  Leilani Woo

## 2023-04-28 NOTE — PROGRESS NOTES
Type of Service: Telephone Visit/ 11 minutes    Originating Location (Patient Location): Home  Distant Location (Provider Location): [facility]  Mode of Communication:  Telephone    Telephone Visit Start Time: 1:30  Telephone Visit End Time (telephone visit stop time): 1:41    How would patient like to obtain AVS? Sylvainhart    Diabetes and Pregnancy Follow-up  Type of Service: Telephone Visit    How would patient like to obtain AVS? Not needed    Subjective/Objective:    Winsome Edwards was called for a scheduled BG review. Last date of communication was: 4/19/23.    Gestational diabetes is being managed with diet and activity    Taking diabetes medications: no    Estimated Date of Delivery: 10/5/23  Next OB visit: 5/23/23    Blood Glucose/Ketone Log:    Date Ketones Fasting Post Breakfast Post Lunch Post Supper   4/21  96 102 130 96   4/22 n 91 90 170(vomited, had pedialyte) 115   4/23 n 95 99 85 111   4/24 n 100 110 132 109   4/25 n 102 108 155(higher CHO meal) 109   4/26 n 101 112 131 109   4/27 n 92 106 128 125   4/28 n 94 109           Assessment:  Winsome had two elevated post meal readings, both explainable. Her FBS have been elevated, 50% in the past week and she reported that she was pretty sure that she would need to start insulin. She was on insulin with her previous pregnancy and she is familiar with how to do an insulin injection. She will be scheduled into the pregnancy clinic. Winsome had no other questions or concerns today.     Ketones: negative.   Fasting blood glucoses: 50% in target.  After breakfast: 100% in target.  After lunch: 71% in target.  After dinner: 100% in target.    Plan/Response:  Meal Plan Recommendation: 30 carbs at breakfast, 45-60 carbs at lunch, 45-60 carbs at supper, 15-30 carbs at each of 3 snacks between meals  Exercise / activity plan: walk for 5-15 minutes after meals  Continue to check BG 4 times daily (fasting and one or two hour(s) after each  meal).  Check ketones every morning until negative for 7 days in a row.  Recommend that patient begin bedtime insulin per protocol and discretion of Irma Chen NP.   Follow-up in 1 week: week of May 1st in pregnancy clinic.      Time Spent: 11 minutes    Any diabetes medication dose changes were made via the CDE Protocol and Collaborative Practice Agreement with the patient's OB/GYN provider. A copy of this encounter was shared with the provider.

## 2023-04-28 NOTE — LETTER
4/28/2023         RE: Winsome Edwards  [address] [address]        Dear Colleague,    Thank you for referring your patient, Winsome Edwards, to the [facility]. Please see a copy of my visit note below.    Type of Service: Telephone Visit/ 11 minutes    Originating Location (Patient Location): Home  Distant Location (Provider Location): [facility]  Mode of Communication:  Telephone    Telephone Visit Start Time: 1:30  Telephone Visit End Time (telephone visit stop time): 1:41    How would patient like to obtain AVS? MyChart    Diabetes and Pregnancy Follow-up  Type of Service: Telephone Visit    How would patient like to obtain AVS? Not needed    Subjective/Objective:    Winsome Edwards was called for a scheduled BG review. Last date of communication was: 4/19/23.    Gestational diabetes is being managed with diet and activity    Taking diabetes medications: no    Estimated Date of Delivery: 10/5/23  Next OB visit: 5/23/23    Blood Glucose/Ketone Log:    Date Ketones Fasting Post Breakfast Post Lunch Post Supper   4/21  96 102 130 96   4/22 n 91 90 170(vomited, had pedialyte) 115   4/23 n 95 99 85 111   4/24 n 100 110 132 109   4/25 n 102 108 155(higher CHO meal) 109   4/26 n 101 112 131 109   4/27 n 92 106 128 125   4/28 n 94 109           Assessment:  Winsome had two elevated post meal readings, both explainable. Her FBS have been elevated, 50% in the past week and she reported that she was pretty sure that she would need to start insulin. She was on insulin with her previous pregnancy and she is familiar with how to do an insulin injection. She will be scheduled into the pregnancy clinic. Winsome had no other questions or concerns today.     Ketones: negative.   Fasting blood glucoses: 50% in target.  After breakfast: 100% in target.  After lunch: 71% in target.  After dinner: 100% in target.    Plan/Response:  Meal Plan Recommendation: 30 carbs  at breakfast, 45-60 carbs at lunch, 45-60 carbs at supper, 15-30 carbs at each of 3 snacks between meals  Exercise / activity plan: walk for 5-15 minutes after meals  Continue to check BG 4 times daily (fasting and one or two hour(s) after each meal).  Check ketones every morning until negative for 7 days in a row.  Recommend that patient begin bedtime insulin per protocol and discretion of Irma Chen NP.   Follow-up in 1 week: week of May 1st in pregnancy clinic.      Time Spent: 11 minutes    Any diabetes medication dose changes were made via the CDE Protocol and Collaborative Practice Agreement with the patient's OB/GYN provider. A copy of this encounter was shared with the provider.

## 2023-04-28 NOTE — PATIENT INSTRUCTIONS
Meal Plan Recommendation: 30 carbs at breakfast, 45-60 carbs at lunch, 45-60 carbs at supper, 15-30 carbs at each of 3 snacks between meals    Exercise / activity plan: walk for 5-15 minutes after meals    Continue to check BG 4 times daily (fasting and one or two hour(s) after each meal).    Check ketones every morning until negative for 7 days in a row.    Start insulin as recommended by Irma Chen NP, Endocrinology.     Follow-up in 1 week: week of May 1st in pregnancy clinic.    Call [phone] for Diabetes related questions or concerns, or to change a diabetes appointment.

## 2023-05-04 ENCOUNTER — ALLIED HEALTH/NURSE VISIT (OUTPATIENT)
Dept: EDUCATION SERVICES | Facility: CLINIC | Age: 28
End: 2023-05-04
Payer: COMMERCIAL

## 2023-05-04 ENCOUNTER — OFFICE VISIT (OUTPATIENT)
Dept: ENDOCRINOLOGY | Facility: CLINIC | Age: 28
End: 2023-05-04
Payer: COMMERCIAL

## 2023-05-04 VITALS
WEIGHT: 218.1 LBS | SYSTOLIC BLOOD PRESSURE: 116 MMHG | HEIGHT: 64 IN | BODY MASS INDEX: 37.24 KG/M2 | HEART RATE: 96 BPM | DIASTOLIC BLOOD PRESSURE: 64 MMHG

## 2023-05-04 DIAGNOSIS — O24.414 INSULIN CONTROLLED GESTATIONAL DIABETES MELLITUS (GDM) IN SECOND TRIMESTER: Primary | ICD-10-CM

## 2023-05-04 DIAGNOSIS — O24.414 INSULIN CONTROLLED GESTATIONAL DIABETES MELLITUS (GDM) DURING PREGNANCY, ANTEPARTUM: Primary | ICD-10-CM

## 2023-05-04 PROBLEM — Z86.32 HISTORY OF GESTATIONAL DIABETES MELLITUS: Status: ACTIVE | Noted: 2023-05-04

## 2023-05-04 PROBLEM — Z87.59 HISTORY OF 3 SPONTANEOUS ABORTIONS: Status: ACTIVE | Noted: 2023-05-04

## 2023-05-04 PROBLEM — E66.9 OBESITY: Status: ACTIVE | Noted: 2023-05-04

## 2023-05-04 PROBLEM — Z34.00 ENCOUNTER FOR SUPERVISION OF NORMAL FIRST PREGNANCY: Status: ACTIVE | Noted: 2020-10-12

## 2023-05-04 PROBLEM — Z34.90 PREGNANT: Status: RESOLVED | Noted: 2020-08-11 | Resolved: 2023-05-04

## 2023-05-04 PROBLEM — O24.419 GESTATIONAL DIABETES MELLITUS: Status: ACTIVE | Noted: 2023-05-04

## 2023-05-04 PROBLEM — O26.90 PREGNANCY, COMPLICATED: Status: RESOLVED | Noted: 2020-12-16 | Resolved: 2023-05-04

## 2023-05-04 PROBLEM — E66.9 OBESITY: Status: RESOLVED | Noted: 2020-12-10 | Resolved: 2023-05-04

## 2023-05-04 PROBLEM — R51.9 GENERALIZED HEADACHE: Status: ACTIVE | Noted: 2023-05-04

## 2023-05-04 PROBLEM — Z34.00 ENCOUNTER FOR SUPERVISION OF NORMAL FIRST PREGNANCY: Status: RESOLVED | Noted: 2020-10-12 | Resolved: 2023-05-04

## 2023-05-04 PROBLEM — F41.9 ANXIETY: Status: ACTIVE | Noted: 2023-05-04

## 2023-05-04 PROBLEM — Z86.69 HISTORY OF MIGRAINE: Status: ACTIVE | Noted: 2023-05-04

## 2023-05-04 PROCEDURE — 99204 OFFICE O/P NEW MOD 45 MIN: CPT | Performed by: NURSE PRACTITIONER

## 2023-05-04 PROCEDURE — 99207 PR DIABETES SELF MANAGEMENT: CPT

## 2023-05-04 RX ORDER — ACYCLOVIR 400 MG/1
TABLET ORAL
Qty: 2 EACH | Refills: 5 | Status: SHIPPED | OUTPATIENT
Start: 2023-05-04 | End: 2023-05-15

## 2023-05-04 NOTE — LETTER
5/4/2023         RE: Winsome Edwards  [address] [address]        Dear Colleague,    Thank you for referring your patient, Winsome Edwards, to the [facility]. Please see a copy of my visit note below.    Diabetes Self-Management and Care Support    Renita is here with her , Paulino, for her follow up on Gestational Diabetes.  She is currently taking insulin to help control her glucose.  She is feeling well.  Stated she is having no swelling in her hands, having no swelling in her feet and she has not felt movement from her baby yet.  She has a 2 year old son with Autism and her parents are living in [place].  Stated she has trouble with chronic migraines and chronic pain.  This is pretty well controlled right now.  With her last pregnancy this improved, then got worse.    OB-[facility]  EDC-10.05.23-18 weeks  Pregnancy number-5, 1 at home  OGTT-102/199/--/--  Previous GDM-No    Current insulin doses  Basal NPH 10 units  Prandial none at this time    Current blood sugars  Blood Sugar Readings:  5.04  F-89  B-99  L-  D-    5.03  F-101  B-117  L-108  D-154    5.02  F-96  B-101  L-112  D-104    5.02  F-88  B-89  L-156  D-115    4.30  F-92  B-138  L-113  D-89      Plan-  Continue with current dose.  Adjust as discussed.  Call if there is any change in readings before next appointment.  Follow up scheduled for 2 weeks       The service provided today was under the supervising provider, Irma Chen, who was available if needed.     Jenniffer Carrizales RN, [facility]  [phone]  In Clinic Tuesday, Wednesday, Thursday  In person   DSMT 30 minutes

## 2023-05-04 NOTE — PROGRESS NOTES
[facility]  ENDOCRINOLOGY    Gestational Diabetes 2023    Winsome Edwards, 1995, [id number]          Reason for visit      1. Insulin controlled gestational diabetes mellitus (GDM) in second trimester        HPI     Winsome Edwards is a very pleasant 27 year old old female who presents for GESTATIONAL Diabetes Mellitus.  She is currently 18 weeks pregnant . Due date is 10/5/23  Diagnosed with GDM based on an OGTT. She hashad  GDM in prior pregnancies.   Current carbohydrate intake:consistent with recommendations of 30g-60g-60g.  I have reviewed her blood glucose logs and note that the:  Fasting readings  are:in range on current regimen  Postprandial readings are:in range on current regimen  Current NPH dose: 10  Current Prandial insulin: 0  Blood glucose logs/meter brought in and data reviewed and incorporated into decision-making.  [facility]    Therapy/Interventions in the past:  She has been seen by the Diabetes Educator- and has received instruction on carbohydrate counting and  consistency.  Records from referring provider and other sources have also been reviewed and incorporated into decision-making.      TODAY:    Winsome Yang) is here today after starting insulin for GDM. This is her second pregnancy with it. We are joined by her , and CHRIS Yi. She is doing a good job managing and making notations when she has Postprandial elevations and why.  She is concerned today, that she has sore fingers because of having to test 4 times a day. We will get a G7 Dexcom sensor on her today and I will order more for her. She is going to get us a share code. She is not yet feeling movement. No swelling in her hands or feet. Instructed in the 2x2 (for every 2 elevations per category between 95 and 100, she will increase by 2 units) and the 2x4 (for every 2 elevations per category > 100, she will increase by 4 units) rules.       Past Medical History  "      Patient Active Problem List   Diagnosis     Anxiety     Generalized headache     History of 3 spontaneous abortions     History of gestational diabetes mellitus     History of migraine     Encounter for supervision of normal first pregnancy     Gestational diabetes mellitus     Obesity        Past Surgical History     Past Surgical History:   Procedure Laterality Date     D & C       DILATION AND CURETTAGE SUCTION N/A 12/7/2021    Procedure: SUCTION DILATION AND CURETTAGE;  Surgeon: Giuliana Black MD;  Location: [facility] Main OR     KNEE SURGERY Left      OTHER SURGICAL HISTORY Left 2014    BENIGN TUMER REMOVAL LEFT LEG.       Family History     No family history on file.    Social History     Social History     Tobacco Use     Smoking status: Never     Smokeless tobacco: Never   Vaping Use     Vaping status: Never Used   Substance Use Topics     Alcohol use: Not Currently     Drug use: Never       Review of Systems     Patient has no polyuria or polydipsia, no chest pain, dyspnea or TIA's, no numbness, tingling or pain in extremities  Remainder negative except as noted in HPI.    Answers for HPI/ROS submitted by the patient on 5/4/2023  General Symptoms: No  Skin Symptoms: No  HENT Symptoms: No  EYE SYMPTOMS: No  HEART SYMPTOMS: No  LUNG SYMPTOMS: No  INTESTINAL SYMPTOMS: No  URINARY SYMPTOMS: No  GYNECOLOGIC SYMPTOMS: No  BREAST SYMPTOMS: No  SKELETAL SYMPTOMS: No  BLOOD SYMPTOMS: No  NERVOUS SYSTEM SYMPTOMS: No  MENTAL HEALTH SYMPTOMS: No      Vital Signs     /64 (BP Location: Right arm, Patient Position: Sitting, Cuff Size: Adult Large)   Pulse 96   Ht 1.626 m (5' 4\")   Wt 98.9 kg (218 lb 1.6 oz)   BMI 37.44 kg/m    Wt Readings from Last 3 Encounters:   05/04/23 98.9 kg (218 lb 1.6 oz)   12/07/21 86 kg (189 lb 9.6 oz)       Physical Exam     GENERAL: Pleasant, alert, appropriate appearance for age. No acute distress,   HEENT: Normocephalic, atraumatic  NECK: normal in " appearance  CHEST/RESPIRATORY: Normal chest wall and respirations.   ABDOMEN: Gravid   SKIN: No melanosis,  ecchymosis,  vitiligo. No acanthosis nigricans  NEURO:  Non-focal, no tremor.  PSYCH: Alert and oriented -appropriate affect. Orientation, judgement and memory appear intact.  MSK: No joint abnormalities, FROM in all four extremities. No kyphosis      Assessment     1. Insulin controlled gestational diabetes mellitus (GDM) in second trimester        Plan     1. GESTATIONAL DIABETES-  Adjust dose as follows:    -NPH [id number]   units. Increase by 2 units every 2 days to keep fasting blood glucose below 95mg/dL  -Novolog 0  units with breakfast  -Novolog 0 units with lunch   -Novolog 0 units with dinner  -Increase by 0 units every 2 days to keep 1 hour after meal blood glucose less than 140mg/dL    We reviewed glucose goals of fasting blood glucose <95 mg/dL and 1 hour post prandial blood glucose of <140 mg/dL.    Monitor blood sugar 4 times daily: Fasting  and 1 hour after each meal.  Contact  this clinic [phone] if blood glucose is not within the above-mentioned goals.     We discussed the importance of excellent glycemic control during pregnancy to limit complications such as fetal macrosomia, shoulder dystocia,  hypoglycemia and hyperbilirubinemia.  I have discussed the patient's increased risk of recurrent GDM and/or development of type 2 diabetes later in life.      F/u in 2 weeks.        Irma Chen NP  2023      Lab Results     Hemoglobin A1c_EXT   Date Value Ref Range Status   2020 4.8 <5.7 % Final       No results found for: CHOL, HDL, TRIG, CHOLHDL    [unfilled]      Current Medications

## 2023-05-04 NOTE — LETTER
2023         RE: Winsome Edwards  [address] [facility] [place] [address]        Dear Colleague,    Thank you for referring your patient, Winsome Edwards, to the [facility]. Please see a copy of my visit note below.    [facility]  ENDOCRINOLOGY    Gestational Diabetes 2023    Winsome Edwards, 1995, [id number]          Reason for visit      1. Insulin controlled gestational diabetes mellitus (GDM) in second trimester        HPI     Winsome Edwards is a very pleasant 27 year old old female who presents for GESTATIONAL Diabetes Mellitus.  She is currently 18 weeks pregnant . Due date is 10/5/23  Diagnosed with GDM based on an OGTT. She hashad  GDM in prior pregnancies.   Current carbohydrate intake:consistent with recommendations of 30g-60g-60g.  I have reviewed her blood glucose logs and note that the:  Fasting readings  are:in range on current regimen  Postprandial readings are:in range on current regimen  Current NPH dose: 10  Current Prandial insulin: 0  Blood glucose logs/meter brought in and data reviewed and incorporated into decision-making.  Planned delivery at: [facility]   OBGYN: Daljit    Therapy/Interventions in the past:  She has been seen by the Diabetes Educator- and has received instruction on carbohydrate counting and  consistency.  Records from referring provider and other sources have also been reviewed and incorporated into decision-making.      TODAY:    Winsome Yang) is here today after starting insulin for GDM. This is her second pregnancy with it. We are joined by her , and CHRIS Yi. She is doing a good job managing and making notations when she has Postprandial elevations and why.  She is concerned today, that she has sore fingers because of having to test 4 times a day. We will get a G7 Dexcom sensor on her today and I will order more for her. She is going to get us a share code. She is not yet feeling movement. No swelling in her  "hands or feet. Instructed in the 2x2 (for every 2 elevations per category between 95 and 100, she will increase by 2 units) and the 2x4 (for every 2 elevations per category > 100, she will increase by 4 units) rules.       Past Medical History       Patient Active Problem List   Diagnosis     Anxiety     Generalized headache     History of 3 spontaneous abortions     History of gestational diabetes mellitus     History of migraine     Encounter for supervision of normal first pregnancy     Gestational diabetes mellitus     Obesity        Past Surgical History     Past Surgical History:   Procedure Laterality Date     D & C       DILATION AND CURETTAGE SUCTION N/A 12/7/2021    Procedure: SUCTION DILATION AND CURETTAGE;  Surgeon: Giuliana Black MD;  Location: [facility] Main OR     KNEE SURGERY Left      OTHER SURGICAL HISTORY Left 2014    BENIGN TUMER REMOVAL LEFT LEG.       Family History     No family history on file.    Social History     Social History     Tobacco Use     Smoking status: Never     Smokeless tobacco: Never   Vaping Use     Vaping status: Never Used   Substance Use Topics     Alcohol use: Not Currently     Drug use: Never       Review of Systems     Patient has no polyuria or polydipsia, no chest pain, dyspnea or TIA's, no numbness, tingling or pain in extremities  Remainder negative except as noted in HPI.    Answers for HPI/ROS submitted by the patient on 5/4/2023  General Symptoms: No  Skin Symptoms: No  HENT Symptoms: No  EYE SYMPTOMS: No  HEART SYMPTOMS: No  LUNG SYMPTOMS: No  INTESTINAL SYMPTOMS: No  URINARY SYMPTOMS: No  GYNECOLOGIC SYMPTOMS: No  BREAST SYMPTOMS: No  SKELETAL SYMPTOMS: No  BLOOD SYMPTOMS: No  NERVOUS SYSTEM SYMPTOMS: No  MENTAL HEALTH SYMPTOMS: No      Vital Signs     /64 (BP Location: Right arm, Patient Position: Sitting, Cuff Size: Adult Large)   Pulse 96   Ht 1.626 m (5' 4\")   Wt 98.9 kg (218 lb 1.6 oz)   BMI 37.44 kg/m    Wt Readings from Last 3 " Encounters:   23 98.9 kg (218 lb 1.6 oz)   21 86 kg (189 lb 9.6 oz)       Physical Exam     GENERAL: Pleasant, alert, appropriate appearance for age. No acute distress,   HEENT: Normocephalic, atraumatic  NECK: normal in appearance  CHEST/RESPIRATORY: Normal chest wall and respirations.   ABDOMEN: Gravid   SKIN: No melanosis,  ecchymosis,  vitiligo. No acanthosis nigricans  NEURO:  Non-focal, no tremor.  PSYCH: Alert and oriented -appropriate affect. Orientation, judgement and memory appear intact.  MSK: No joint abnormalities, FROM in all four extremities. No kyphosis      Assessment     1. Insulin controlled gestational diabetes mellitus (GDM) in second trimester        Plan     1. GESTATIONAL DIABETES-  Adjust dose as follows:    -NPH [id number]   units. Increase by 2 units every 2 days to keep fasting blood glucose below 95mg/dL  -Novolog 0  units with breakfast  -Novolog 0 units with lunch   -Novolog 0 units with dinner  -Increase by 0 units every 2 days to keep 1 hour after meal blood glucose less than 140mg/dL    We reviewed glucose goals of fasting blood glucose <95 mg/dL and 1 hour post prandial blood glucose of <140 mg/dL.    Monitor blood sugar 4 times daily: Fasting  and 1 hour after each meal.  Contact  this clinic [phone] if blood glucose is not within the above-mentioned goals.     We discussed the importance of excellent glycemic control during pregnancy to limit complications such as fetal macrosomia, shoulder dystocia,  hypoglycemia and hyperbilirubinemia.  I have discussed the patient's increased risk of recurrent GDM and/or development of type 2 diabetes later in life.      F/u in 2 weeks.        Irma Chen NP  2023      Lab Results     Hemoglobin A1c_EXT   Date Value Ref Range Status   2020 4.8 <5.7 % Final       No results found for: CHOL, HDL, TRIG, CHOLHDL    @[facility]@      Current Medications           Again, thank you for allowing me to participate in  the care of your patient.        Sincerely,        Irma Chen NP

## 2023-05-04 NOTE — PROGRESS NOTES
Diabetes Self-Management and Care Support    Renita is here with her , Paulino, for her follow up on Gestational Diabetes.  She is currently taking insulin to help control her glucose.  She is feeling well.  Stated she is having no swelling in her hands, having no swelling in her feet and she has not felt movement from her baby yet.  She has a 2 year old son with Autism and her parents are living in [place].  Stated she has trouble with chronic migraines and chronic pain.  This is pretty well controlled right now.  With her last pregnancy this improved, then got worse.    OB-[facility]ies  EDC-10.05.23-18 weeks  Pregnancy number-5, 1 at home  OGTT-102/199/--/--  Previous GDM-No    Current insulin doses  Basal NPH 10 units  Prandial none at this time    Current blood sugars  Blood Sugar Readings:  5.04  F-89  B-99  L-  D-    5.03  F-101  B-117  L-108  D-154    5.02  F-96  B-101  L-112  D-104    5.02  F-88  B-89  L-156  D-115    4.30  F-92  B-138  L-113  D-89      Plan-  Continue with current dose.  Adjust as discussed.  Call if there is any change in readings before next appointment.  Follow up scheduled for 2 weeks       The service provided today was under the supervising provider, Irma Chen, who was available if needed.     Jenniffer Carrizales RN, [facility]  [phone]  In Clinic Tuesday, Wednesday, Thursday  In person   DSMT 30 minutes

## 2023-05-05 ENCOUNTER — TELEPHONE (OUTPATIENT)
Dept: ENDOCRINOLOGY | Facility: CLINIC | Age: 28
End: 2023-05-05
Payer: COMMERCIAL

## 2023-05-05 NOTE — TELEPHONE ENCOUNTER
Prior Authorization Approval    Authorization Effective Date: 5/5/2023  Authorization Expiration Date: 5/4/2024  Medication: Dexcom G7 sensors   Approved Dose/Quantity: 3/30 days   Reference #: [id number]   Insurance Company:    Expected CoPay: 295.00     CoPay Card Available:      Foundation Assistance Needed:    Which Pharmacy is filling the prescription (Not needed for infusion/clinic administered):    Pharmacy Notified:    Patient Notified:

## 2023-05-05 NOTE — TELEPHONE ENCOUNTER
PA Initiation    Medication: Dexcom G7 B-Obvious   Insurance Company:    Pharmacy Filling the Rx:    Filling Pharmacy Phone:    Filling Pharmacy Fax:    Start Date: 5/5/2023    Key: [id number]

## 2023-05-08 ENCOUNTER — HEALTH MAINTENANCE LETTER (OUTPATIENT)
Age: 28
End: 2023-05-08

## 2023-05-18 ENCOUNTER — APPOINTMENT (OUTPATIENT)
Dept: EDUCATION SERVICES | Facility: CLINIC | Age: 28
End: 2023-05-18
Payer: COMMERCIAL

## 2023-05-18 ENCOUNTER — OFFICE VISIT (OUTPATIENT)
Dept: ENDOCRINOLOGY | Facility: CLINIC | Age: 28
End: 2023-05-18
Payer: COMMERCIAL

## 2023-05-18 VITALS
HEART RATE: 100 BPM | WEIGHT: 172.1 LBS | SYSTOLIC BLOOD PRESSURE: 118 MMHG | OXYGEN SATURATION: 96 % | DIASTOLIC BLOOD PRESSURE: 60 MMHG | BODY MASS INDEX: 29.54 KG/M2

## 2023-05-18 DIAGNOSIS — O24.414 INSULIN CONTROLLED GESTATIONAL DIABETES MELLITUS (GDM) IN SECOND TRIMESTER: Primary | ICD-10-CM

## 2023-05-18 PROCEDURE — 99213 OFFICE O/P EST LOW 20 MIN: CPT | Performed by: NURSE PRACTITIONER

## 2023-05-18 NOTE — LETTER
2023         RE: Winsome Edwards  [address] [address]        Dear Colleague,    Thank you for referring your patient, Winsome Edwards, to the [facility]. Please see a copy of my visit note below.    [facility]  ENDOCRINOLOGY    Gestational Diabetes 2023    Winsome Edwards, 1995, [id number]          Reason for visit      1. Insulin controlled gestational diabetes mellitus (GDM) in second trimester        HPI     Winsome Edwards is a very pleasant 27 year old old female who presents for GESTATIONAL Diabetes Mellitus.  She is currently 20 weeks pregnant . Due date is 10/5/23  Diagnosed with GDM based on an OGTT. She hashad  GDM in prior pregnancies.   Current carbohydrate intake:consistent with recommendations of 30g-60g-60g.  I have reviewed her blood glucose logs and note that the:  Fasting readings  are:in range on current regimen  Postprandial readings are:in range on current regimen  Current NPH dose: 10  Current Prandial insulin: 0  Blood glucose logs/meter brought in and data reviewed and incorporated into decision-making.  Planned delivery at: [facility]   OBGYN: Daljit  Therapy/Interventions in the past:  She has been seen by the Diabetes Educator- and has received instruction on carbohydrate counting and  consistency.  Records from referring provider and other sources have also been reviewed and incorporated into decision-making.      TODAY:    Winsome Yang) is here today after starting insulin for GDM. This is her second pregnancy with it. We are joined by her , and CHRIS Yi.  Pt is keeping a log book of her BG and her FBS look quite good. She had one elevation, but the rest were in high 80s to low 90s. Baby is moving appropriately and she is having no swelling at present.     Past Medical History       Patient Active Problem List   Diagnosis     Anxiety     Generalized headache     History of 3 spontaneous abortions      History of gestational diabetes mellitus     History of migraine     Encounter for supervision of normal first pregnancy     Gestational diabetes mellitus     Obesity        Past Surgical History     Past Surgical History:   Procedure Laterality Date     D & C       DILATION AND CURETTAGE SUCTION N/A 12/7/2021    Procedure: SUCTION DILATION AND CURETTAGE;  Surgeon: Giuliana Black MD;  Location: [facility] Main OR     KNEE SURGERY Left      OTHER SURGICAL HISTORY Left 2014    BENIGN TUMER REMOVAL LEFT LEG.       Family History     No family history on file.    Social History     Social History     Tobacco Use     Smoking status: Never     Smokeless tobacco: Never   Vaping Use     Vaping status: Never Used   Substance Use Topics     Alcohol use: Not Currently     Drug use: Never       Review of Systems     Patient has no polyuria or polydipsia, no chest pain, dyspnea or TIA's, no numbness, tingling or pain in extremities  Remainder negative except as noted in HPI.    Answers for HPI/ROS submitted by the patient on 5/4/2023  General Symptoms: No  Skin Symptoms: No  HENT Symptoms: No  EYE SYMPTOMS: No  HEART SYMPTOMS: No  LUNG SYMPTOMS: No  INTESTINAL SYMPTOMS: No  URINARY SYMPTOMS: No  GYNECOLOGIC SYMPTOMS: No  BREAST SYMPTOMS: No  SKELETAL SYMPTOMS: No  BLOOD SYMPTOMS: No  NERVOUS SYSTEM SYMPTOMS: No  MENTAL HEALTH SYMPTOMS: No        Vital Signs     /60 (BP Location: Right arm, Patient Position: Sitting, Cuff Size: Adult Large)   Pulse 100   Wt 78.1 kg (172 lb 1.6 oz)   SpO2 96%   BMI 29.54 kg/m    Wt Readings from Last 3 Encounters:   05/18/23 78.1 kg (172 lb 1.6 oz)   05/04/23 98.9 kg (218 lb 1.6 oz)   12/07/21 86 kg (189 lb 9.6 oz)       Physical Exam     GENERAL: Pleasant, alert, appropriate appearance for age. No acute distress,   HEENT: Normocephalic, atraumatic  NECK: normal in appearance  CHEST/RESPIRATORY: Normal chest wall and respirations.   ABDOMEN: Gravid   SKIN: No melanosis,   ecchymosis,  vitiligo. No acanthosis nigricans  NEURO:  Non-focal, no tremor.  PSYCH: Alert and oriented -appropriate affect. Orientation, judgement and memory appear intact.  MSK: No joint abnormalities, FROM in all four extremities. No kyphosis      Assessment     1. Insulin controlled gestational diabetes mellitus (GDM) in second trimester        Plan       1. GESTATIONAL DIABETES-  Adjust dose as follows:     -NPH [id number]   units. Increase by 2 units every 2 days to keep fasting blood glucose below 95mg/dL  -Novolog 0  units with breakfast  -Novolog 0 units with lunch   -Novolog 0 units with dinner  -Increase by 0 units every 2 days to keep 1 hour after meal blood glucose less than 140mg/dL     We reviewed glucose goals of fasting blood glucose <95 mg/dL and 1 hour post prandial blood glucose of <140 mg/dL.    Monitor blood sugar 4 times daily: Fasting  and 1 hour after each meal.  Contact  this clinic [phone] if blood glucose is not within the above-mentioned goals.      We discussed the importance of excellent glycemic control during pregnancy to limit complications such as fetal macrosomia, shoulder dystocia,  hypoglycemia and hyperbilirubinemia.  I have discussed the patient's increased risk of recurrent GDM and/or development of type 2 diabetes later in life.       F/u in 2 weeks.      Irma Chen NP  2023        Lab Results     Hemoglobin A1c_EXT   Date Value Ref Range Status   2020 4.8 <5.7 % Final       No results found for: CHOL, HDL, TRIG, CHOLHDL    [unfilled]      Current Medications               Again, thank you for allowing me to participate in the care of your patient.        Sincerely,        Irma Chen NP

## 2023-05-18 NOTE — PROGRESS NOTES
[facility]  ENDOCRINOLOGY    Gestational Diabetes 2023    Winsome Edwards, 1995, [id number]          Reason for visit      1. Insulin controlled gestational diabetes mellitus (GDM) in second trimester        HPI     Winsome Edwards is a very pleasant 27 year old old female who presents for GESTATIONAL Diabetes Mellitus.  She is currently 20 weeks pregnant . Due date is 10/5/23  Diagnosed with GDM based on an OGTT. She hashad  GDM in prior pregnancies.   Current carbohydrate intake:consistent with recommendations of 30g-60g-60g.  I have reviewed her blood glucose logs and note that the:  Fasting readings  are:in range on current regimen  Postprandial readings are:in range on current regimen  Current NPH dose: 10  Current Prandial insulin: 0  Blood glucose logs/meter brought in and data reviewed and incorporated into decision-making.  [facility]  Therapy/Interventions in the past:  She has been seen by the Diabetes Educator- and has received instruction on carbohydrate counting and  consistency.  Records from referring provider and other sources have also been reviewed and incorporated into decision-making.      TODAY:    Winsome Yang) is here today after starting insulin for GDM. This is her second pregnancy with it. We are joined by her , and CHRIS Yi.  Pt is keeping a log book of her BG and her FBS look quite good. She had one elevation, but the rest were in high 80s to low 90s. Baby is moving appropriately and she is having no swelling at present.     Past Medical History       Patient Active Problem List   Diagnosis     Anxiety     Generalized headache     History of 3 spontaneous abortions     History of gestational diabetes mellitus     History of migraine     Encounter for supervision of normal first pregnancy     Gestational diabetes mellitus     Obesity        Past Surgical History     Past Surgical History:   Procedure Laterality Date     D & C        DILATION AND CURETTAGE SUCTION N/A 12/7/2021    Procedure: SUCTION DILATION AND CURETTAGE;  Surgeon: Giuliana Black MD;  Location: [facility] Main OR     KNEE SURGERY Left      OTHER SURGICAL HISTORY Left 2014    BENIGN TUMER REMOVAL LEFT LEG.       Family History     No family history on file.    Social History     Social History     Tobacco Use     Smoking status: Never     Smokeless tobacco: Never   Vaping Use     Vaping status: Never Used   Substance Use Topics     Alcohol use: Not Currently     Drug use: Never       Review of Systems     Patient has no polyuria or polydipsia, no chest pain, dyspnea or TIA's, no numbness, tingling or pain in extremities  Remainder negative except as noted in HPI.    Answers for HPI/ROS submitted by the patient on 5/4/2023  General Symptoms: No  Skin Symptoms: No  HENT Symptoms: No  EYE SYMPTOMS: No  HEART SYMPTOMS: No  LUNG SYMPTOMS: No  INTESTINAL SYMPTOMS: No  URINARY SYMPTOMS: No  GYNECOLOGIC SYMPTOMS: No  BREAST SYMPTOMS: No  SKELETAL SYMPTOMS: No  BLOOD SYMPTOMS: No  NERVOUS SYSTEM SYMPTOMS: No  MENTAL HEALTH SYMPTOMS: No        Vital Signs     /60 (BP Location: Right arm, Patient Position: Sitting, Cuff Size: Adult Large)   Pulse 100   Wt 78.1 kg (172 lb 1.6 oz)   SpO2 96%   BMI 29.54 kg/m    Wt Readings from Last 3 Encounters:   05/18/23 78.1 kg (172 lb 1.6 oz)   05/04/23 98.9 kg (218 lb 1.6 oz)   12/07/21 86 kg (189 lb 9.6 oz)       Physical Exam     GENERAL: Pleasant, alert, appropriate appearance for age. No acute distress,   HEENT: Normocephalic, atraumatic  NECK: normal in appearance  CHEST/RESPIRATORY: Normal chest wall and respirations.   ABDOMEN: Gravid   SKIN: No melanosis,  ecchymosis,  vitiligo. No acanthosis nigricans  NEURO:  Non-focal, no tremor.  PSYCH: Alert and oriented -appropriate affect. Orientation, judgement and memory appear intact.  MSK: No joint abnormalities, FROM in all four extremities. No kyphosis      Assessment     1.  Insulin controlled gestational diabetes mellitus (GDM) in second trimester        Plan       1. GESTATIONAL DIABETES-  Adjust dose as follows:     -NPH [id number]   units. Increase by 2 units every 2 days to keep fasting blood glucose below 95mg/dL  -Novolog 0  units with breakfast  -Novolog 0 units with lunch   -Novolog 0 units with dinner  -Increase by 0 units every 2 days to keep 1 hour after meal blood glucose less than 140mg/dL     We reviewed glucose goals of fasting blood glucose <95 mg/dL and 1 hour post prandial blood glucose of <140 mg/dL.    Monitor blood sugar 4 times daily: Fasting  and 1 hour after each meal.  Contact  this clinic [phone] if blood glucose is not within the above-mentioned goals.      We discussed the importance of excellent glycemic control during pregnancy to limit complications such as fetal macrosomia, shoulder dystocia,  hypoglycemia and hyperbilirubinemia.  I have discussed the patient's increased risk of recurrent GDM and/or development of type 2 diabetes later in life.       F/u in 2 weeks.      Irma Chen NP  2023        Lab Results     Hemoglobin A1c_EXT   Date Value Ref Range Status   2020 4.8 <5.7 % Final       No results found for: CHOL, HDL, TRIG, CHOLHDL    [unfilled]      Current Medications

## 2023-06-01 ENCOUNTER — ALLIED HEALTH/NURSE VISIT (OUTPATIENT)
Dept: EDUCATION SERVICES | Facility: CLINIC | Age: 28
End: 2023-06-01
Payer: COMMERCIAL

## 2023-06-01 ENCOUNTER — OFFICE VISIT (OUTPATIENT)
Dept: ENDOCRINOLOGY | Facility: CLINIC | Age: 28
End: 2023-06-01
Payer: COMMERCIAL

## 2023-06-01 VITALS
WEIGHT: 222 LBS | SYSTOLIC BLOOD PRESSURE: 118 MMHG | DIASTOLIC BLOOD PRESSURE: 60 MMHG | BODY MASS INDEX: 38.11 KG/M2 | OXYGEN SATURATION: 97 % | HEART RATE: 101 BPM

## 2023-06-01 DIAGNOSIS — O24.414 INSULIN CONTROLLED GESTATIONAL DIABETES MELLITUS (GDM) IN SECOND TRIMESTER: Primary | ICD-10-CM

## 2023-06-01 DIAGNOSIS — O24.414 INSULIN CONTROLLED GESTATIONAL DIABETES MELLITUS (GDM) DURING PREGNANCY, ANTEPARTUM: Primary | ICD-10-CM

## 2023-06-01 PROCEDURE — G0108 DIAB MANAGE TRN  PER INDIV: HCPCS

## 2023-06-01 PROCEDURE — 99214 OFFICE O/P EST MOD 30 MIN: CPT | Performed by: NURSE PRACTITIONER

## 2023-06-01 NOTE — PROGRESS NOTES
[facility]  ENDOCRINOLOGY    Gestational Diabetes 2023    Winsome Edwards, 1995, [id number]          Reason for visit      1. Insulin controlled gestational diabetes mellitus (GDM) in second trimester        HPI     Winsome Edwards is a very pleasant 27 year old old female who presents for GESTATIONAL Diabetes Mellitus.  She is currently 22 weeks pregnant . Due date is 10/5/23  Diagnosed with GDM based on an OGTT. She hashad  GDM in prior pregnancies.   Current carbohydrate intake:consistent with recommendations of 30g-60g-60g.  I have reviewed her blood glucose logs and note that the:  Fasting readings  are:in range on current regimen  Postprandial readings are:in range on current regimen  Current NPH dose: 10  Current Prandial insulin: 0  Blood glucose logs/meter brought in and data reviewed and incorporated into decision-making.  Planned delivery at: [facility]   OBGYN: Wayne    Therapy/Interventions in the past:  She has been seen by the Diabetes Educator- and has received instruction on carbohydrate counting and  consistency.  Records from referring provider and other sources have also been reviewed and incorporated into decision-making.      TODAY:    Renita is here today in f/u for GDM. She is here with her . We are joined by CHRIS Yi. Pt provides BG today and they are all within range. She is feeling well. Baby is moving appropriately and she is having no swelling at present.       Past Medical History       Patient Active Problem List   Diagnosis     Anxiety     Generalized headache     History of 3 spontaneous abortions     History of gestational diabetes mellitus     History of migraine     Encounter for supervision of normal first pregnancy     Gestational diabetes mellitus     Obesity        Past Surgical History     Past Surgical History:   Procedure Laterality Date     D & C       DILATION AND CURETTAGE SUCTION N/A 2021    Procedure: SUCTION DILATION AND CURETTAGE;   Surgeon: Giuliana Black MD;  Location: [facility] Main OR     KNEE SURGERY Left      OTHER SURGICAL HISTORY Left 2014    BENIGN TUMER REMOVAL LEFT LEG.       Family History     History reviewed. No pertinent family history.    Social History     Social History     Tobacco Use     Smoking status: Never     Smokeless tobacco: Never   Vaping Use     Vaping status: Never Used   Substance Use Topics     Alcohol use: Not Currently     Drug use: Never       Review of Systems     Patient has no polyuria or polydipsia, no chest pain, dyspnea or TIA's, no numbness, tingling or pain in extremities  Remainder negative except as noted in HPI.      Vital Signs     /60 (BP Location: Right arm, Patient Position: Sitting, Cuff Size: Adult Large)   Pulse 101   Wt 100.7 kg (222 lb)   SpO2 97%   BMI 38.11 kg/m    Wt Readings from Last 3 Encounters:   06/01/23 100.7 kg (222 lb)   05/18/23 78.1 kg (172 lb 1.6 oz)   05/04/23 98.9 kg (218 lb 1.6 oz)       Physical Exam     GENERAL: Pleasant, alert, appropriate appearance for age. No acute distress,   HEENT: Normocephalic, atraumatic  NECK: normal in appearance  CHEST/RESPIRATORY: Normal chest wall and respirations.   ABDOMEN: Gravid   SKIN: No melanosis,  ecchymosis,  vitiligo. No acanthosis nigricans  NEURO:  Non-focal, no tremor.  PSYCH: Alert and oriented -appropriate affect. Orientation, judgement and memory appear intact.  MSK: No joint abnormalities, FROM in all four extremities. No kyphosis      Assessment     1. Insulin controlled gestational diabetes mellitus (GDM) in second trimester        Plan     1. GESTATIONAL DIABETES-  Adjust dose as follows:     -NPH [id number]   units. Increase by 2 units every 2 days to keep fasting blood glucose below 95mg/dL  -Novolog 0  units with breakfast  -Novolog 0 units with lunch   -Novolog 0 units with dinner  -Increase by 0 units every 2 days to keep 1 hour after meal blood glucose less than 140mg/dL     We reviewed  glucose goals of fasting blood glucose <95 mg/dL and 1 hour post prandial blood glucose of <140 mg/dL.    Monitor blood sugar 4 times daily: Fasting  and 1 hour after each meal.  Contact  this clinic [phone] if blood glucose is not within the above-mentioned goals.      We discussed the importance of excellent glycemic control during pregnancy to limit complications such as fetal macrosomia, shoulder dystocia,  hypoglycemia and hyperbilirubinemia.  I have discussed the patient's increased risk of recurrent GDM and/or development of type 2 diabetes later in life.       F/u in 2 weeks.        Irma Chen NP  2023      Lab Results     Hemoglobin A1c_EXT   Date Value Ref Range Status   2020 4.8 <5.7 % Final       No results found for: CHOL, HDL, TRIG, CHOLHDL    [unfilled]      Current Medications

## 2023-06-01 NOTE — LETTER
6/1/2023         RE: Winsome Edwards  [address] [address]        Dear Colleague,    Thank you for referring your patient, Winsome Edwards, to the [facility]. Please see a copy of my visit note below.    Diabetes Self-Management and Care Support    Renita is here with her , Paulino, for her follow up on Gestational Diabetes.  She is currently taking insulin to help control her glucose.  She is feeling good.  Stated she is not having swelling in her hands, not having swelling in her feet and her baby is moving a lot.  She feels like she is getting enough to eat during her day.  She started using a Dexcom again last night.  Previously she had trouble with insertion and was not comfortable trying again, until now.  She bring her logbook and all readings are within range.    OB-Mies  EDC-10.05.23-22 weeks 0 days  Pregnancy number-3  OGTT-102/199/--/--  Previous GDM-No    Current insulin doses  Basal NPH 10 units, increasing 2 units every 2 high readings  Prandial none at this time    Current blood sugars          Plan-  Continue with current doses and plan.  Call if there is any change in readings before next appointment.  Follow up scheduled for 2 weeks with me and 4 weeks with endocrinology.       The service provided today was under the supervising provider, Irma Chen, who was available if needed.     Jenniffer Carrizales RN, [facility]  [phone]  In Clinic Tuesday, Wednesday, Thursday  In person   DSMT 30 minutes

## 2023-06-01 NOTE — LETTER
2023         RE: Winsome Edwards  [address] [address]        Dear Colleague,    Thank you for referring your patient, Winsome Edwards, to the [facility]. Please see a copy of my visit note below.    [facility]  ENDOCRINOLOGY    Gestational Diabetes 2023    Winsome Edwards, 1995, [id number]          Reason for visit      1. Insulin controlled gestational diabetes mellitus (GDM) in second trimester        HPI     Winsome Edwards is a very pleasant 27 year old old female who presents for GESTATIONAL Diabetes Mellitus.  She is currently 22 weeks pregnant . Due date is 10/5/23  Diagnosed with GDM based on an OGTT. She hashad  GDM in prior pregnancies.   Current carbohydrate intake:consistent with recommendations of 30g-60g-60g.  I have reviewed her blood glucose logs and note that the:  Fasting readings  are:in range on current regimen  Postprandial readings are:in range on current regimen  Current NPH dose: 10  Current Prandial insulin: 0  Blood glucose logs/meter brought in and data reviewed and incorporated into decision-making.  Planned delivery at: [facility]   OBGYN: [facility]    Therapy/Interventions in the past:  She has been seen by the Diabetes Educator- and has received instruction on carbohydrate counting and  consistency.  Records from referring provider and other sources have also been reviewed and incorporated into decision-making.      TODAY:    Renita is here today in f/u for GDM. She is here with her . We are joined by CHRIS Yi. Pt provides BG today and they are all within range. She is feeling well. Baby is moving appropriately and she is having no swelling at present.       Past Medical History       Patient Active Problem List   Diagnosis     Anxiety     Generalized headache     History of 3 spontaneous abortions     History of gestational diabetes mellitus     History of migraine     Encounter for supervision of normal  first pregnancy     Gestational diabetes mellitus     Obesity        Past Surgical History     Past Surgical History:   Procedure Laterality Date     D & C       DILATION AND CURETTAGE SUCTION N/A 12/7/2021    Procedure: SUCTION DILATION AND CURETTAGE;  Surgeon: Giuliana Black MD;  Location: [facility] Main OR     KNEE SURGERY Left      OTHER SURGICAL HISTORY Left 2014    BENIGN TUMER REMOVAL LEFT LEG.       Family History     History reviewed. No pertinent family history.    Social History     Social History     Tobacco Use     Smoking status: Never     Smokeless tobacco: Never   Vaping Use     Vaping status: Never Used   Substance Use Topics     Alcohol use: Not Currently     Drug use: Never       Review of Systems     Patient has no polyuria or polydipsia, no chest pain, dyspnea or TIA's, no numbness, tingling or pain in extremities  Remainder negative except as noted in HPI.      Vital Signs     /60 (BP Location: Right arm, Patient Position: Sitting, Cuff Size: Adult Large)   Pulse 101   Wt 100.7 kg (222 lb)   SpO2 97%   BMI 38.11 kg/m    Wt Readings from Last 3 Encounters:   06/01/23 100.7 kg (222 lb)   05/18/23 78.1 kg (172 lb 1.6 oz)   05/04/23 98.9 kg (218 lb 1.6 oz)       Physical Exam     GENERAL: Pleasant, alert, appropriate appearance for age. No acute distress,   HEENT: Normocephalic, atraumatic  NECK: normal in appearance  CHEST/RESPIRATORY: Normal chest wall and respirations.   ABDOMEN: Gravid   SKIN: No melanosis,  ecchymosis,  vitiligo. No acanthosis nigricans  NEURO:  Non-focal, no tremor.  PSYCH: Alert and oriented -appropriate affect. Orientation, judgement and memory appear intact.  MSK: No joint abnormalities, FROM in all four extremities. No kyphosis      Assessment     1. Insulin controlled gestational diabetes mellitus (GDM) in second trimester        Plan     1. GESTATIONAL DIABETES-  Adjust dose as follows:     -NPH [id number]   units. Increase by 2 units every 2 days to  keep fasting blood glucose below 95mg/dL  -Novolog 0  units with breakfast  -Novolog 0 units with lunch   -Novolog 0 units with dinner  -Increase by 0 units every 2 days to keep 1 hour after meal blood glucose less than 140mg/dL     We reviewed glucose goals of fasting blood glucose <95 mg/dL and 1 hour post prandial blood glucose of <140 mg/dL.    Monitor blood sugar 4 times daily: Fasting  and 1 hour after each meal.  Contact  this clinic [phone] if blood glucose is not within the above-mentioned goals.      We discussed the importance of excellent glycemic control during pregnancy to limit complications such as fetal macrosomia, shoulder dystocia,  hypoglycemia and hyperbilirubinemia.  I have discussed the patient's increased risk of recurrent GDM and/or development of type 2 diabetes later in life.       F/u in 2 weeks.        Irma Chen NP  2023      Lab Results     Hemoglobin A1c_EXT   Date Value Ref Range Status   2020 4.8 <5.7 % Final       No results found for: CHOL, HDL, TRIG, CHOLHDL    @[facility]@      Current Medications                         Again, thank you for allowing me to participate in the care of your patient.        Sincerely,        Irma Chen NP

## 2023-06-02 NOTE — PROGRESS NOTES
Diabetes Self-Management and Care Support    Renita is here with her , Paulino, for her follow up on Gestational Diabetes.  She is currently taking insulin to help control her glucose.  She is feeling good.  Stated she is not having swelling in her hands, not having swelling in her feet and her baby is moving a lot.  She feels like she is getting enough to eat during her day.  She started using a Dexcom again last night.  Previously she had trouble with insertion and was not comfortable trying again, until now.  She bring her logbook and all readings are within range.    OB-Mies  EDC-10.05.23-22 weeks 0 days  Pregnancy number-3  OGTT-102/199/--/--  Previous GDM-No    Current insulin doses  Basal NPH 10 units, increasing 2 units every 2 high readings  Prandial none at this time    Current blood sugars          Plan-  Continue with current doses and plan.  Call if there is any change in readings before next appointment.  Follow up scheduled for 2 weeks with me and 4 weeks with endocrinology.       The service provided today was under the supervising provider, Irma Chen, who was available if needed.     Jenniffer Carrizales RN, [facility]  [phone]  In Clinic Tuesday, Wednesday, Thursday  In person   DSMT 30 minutes

## 2023-06-15 ENCOUNTER — ALLIED HEALTH/NURSE VISIT (OUTPATIENT)
Dept: EDUCATION SERVICES | Facility: CLINIC | Age: 28
End: 2023-06-15
Payer: COMMERCIAL

## 2023-06-15 ENCOUNTER — TELEPHONE (OUTPATIENT)
Dept: EDUCATION SERVICES | Facility: CLINIC | Age: 28
End: 2023-06-15

## 2023-06-15 VITALS — WEIGHT: 222.3 LBS | BODY MASS INDEX: 38.16 KG/M2

## 2023-06-15 DIAGNOSIS — O24.414 INSULIN CONTROLLED GESTATIONAL DIABETES MELLITUS (GDM) DURING PREGNANCY, ANTEPARTUM: Primary | ICD-10-CM

## 2023-06-15 PROCEDURE — 99207 PR NO CHARGE LOS: CPT

## 2023-06-15 NOTE — TELEPHONE ENCOUNTER
ERROR, DISREGARD NOTE.    Please contact patient to schedule the rest of her appointments in pregnancy clinic.  Thanks,  Jenniffer

## 2023-06-15 NOTE — Clinical Note
6/15/2023         RE: Winsome Edwards  [address] [address]        Dear Colleague,    Thank you for referring your patient, Winsome Edwarsd, to the [facility]. Please see a copy of my visit note below.    No notes on file

## 2023-06-16 NOTE — TELEPHONE ENCOUNTER
Please continue to try calling her.  She is currently scheduled through the end of July and needs appointments until the beginning of September.

## 2023-06-27 NOTE — TELEPHONE ENCOUNTER
[facility] If no response by 06.28.2023, will add patient on schedule and will inform CDE and Endo team to inform patient of the appointments.

## 2023-06-29 ENCOUNTER — ALLIED HEALTH/NURSE VISIT (OUTPATIENT)
Dept: EDUCATION SERVICES | Facility: CLINIC | Age: 28
End: 2023-06-29
Payer: COMMERCIAL

## 2023-06-29 ENCOUNTER — OFFICE VISIT (OUTPATIENT)
Dept: ENDOCRINOLOGY | Facility: CLINIC | Age: 28
End: 2023-06-29
Payer: COMMERCIAL

## 2023-06-29 VITALS
DIASTOLIC BLOOD PRESSURE: 60 MMHG | OXYGEN SATURATION: 97 % | HEART RATE: 115 BPM | WEIGHT: 225.5 LBS | BODY MASS INDEX: 38.71 KG/M2 | SYSTOLIC BLOOD PRESSURE: 114 MMHG

## 2023-06-29 DIAGNOSIS — O24.414 INSULIN CONTROLLED GESTATIONAL DIABETES MELLITUS (GDM) DURING PREGNANCY, ANTEPARTUM: Primary | ICD-10-CM

## 2023-06-29 DIAGNOSIS — O24.414 INSULIN CONTROLLED GESTATIONAL DIABETES MELLITUS (GDM) IN SECOND TRIMESTER: Primary | ICD-10-CM

## 2023-06-29 PROCEDURE — G0108 DIAB MANAGE TRN  PER INDIV: HCPCS

## 2023-06-29 PROCEDURE — 99213 OFFICE O/P EST LOW 20 MIN: CPT | Performed by: NURSE PRACTITIONER

## 2023-06-29 NOTE — LETTER
6/29/2023         RE: Winsome Edwards  [address] [facility] [address]        Dear Colleague,    Thank you for referring your patient, Winsome Edwards, to the [facility]. Please see a copy of my visit note below.    Diabetes Self-Management and Care Support    Renita is here alone today for her follow up on Gestational Diabetes.  She is currently taking insulin to help control her glucose.  She is feeling good.  Stated she is not having swelling in her hands, not having swelling in her feet and her baby is moving well.  She had an ultrasound recently and her daughter is measuring in the 18th precentile.  Her parents closed on their home and have been spending time there.  Stated this has relieved some of her stress.    OB-Mies  EDC-10.05.23-26 weeks 0 days  Pregnancy number-3  OGTT-102/199/--/--  Previous GDM-No    Current insulin doses  Basal NPH 10 units  Prandial not at this time    Current blood sugars              Plan-  Continue with current dose.  Increase by 2 units every 2 times fasting reading is over 95.  Call if there is any change in readings before next appointment.  Follow up scheduled for 2 weeks with endocrinology       The service provided today was under the supervising provider, Irma Chen, who was available if needed.     Jenniffer Carrizales RN, [facility]  [phone]  In Clinic Tuesday, Wednesday, Thursday  In person   DSMT 30 minutes

## 2023-06-29 NOTE — LETTER
"    2023         RE: Winsome Edwards  [address] [facility] [address]        Dear Colleague,    Thank you for referring your patient, Winsome Edwards, to the [facility]. Please see a copy of my visit note below.      [facility]  ENDOCRINOLOGY    Gestational Diabetes 2023    Winsome Edwards, 1995, [id number]          Reason for visit      1. Insulin controlled gestational diabetes mellitus (GDM) in second trimester        HPI     Winsome Edwards is a very pleasant 27 year old old female who presents for GESTATIONAL Diabetes Mellitus.  She is currently 22 weeks pregnant . Due date is 10/5/23  Diagnosed with GDM based on an OGTT. She hashad  GDM in prior pregnancies.   Current carbohydrate intake:consistent with recommendations of 30g-60g-60g.  I have reviewed her blood glucose logs and note that the:  Fasting readings  are:in range on current regimen  Postprandial readings are:in range on current regimen  Current NPH dose: 10  Current Prandial insulin: 0  Blood glucose logs/meter brought in and data reviewed and incorporated into decision-making.  Planned delivery at: [facility]   OBGYN: [facility]  Therapy/Interventions in the past:  She has been seen by the Diabetes Educator- and has received instruction on carbohydrate counting and  consistency.  Records from referring provider and other sources have also been reviewed and incorporated into decision-making.      TODAY:    Renita returns today in f/u for GDM . We are joined by CHRIS Yi. Pt continues to do a stellar job with her management. At a recent growth US, she was told that \"baby's head was small\", however they also said that baby was in breech position and that it was an odd angle for them to try and measure.  She has been drinking Protein shakes to try and get more Protein into her diet. She is drinking [employer] brand, and putting in powdered PB.  Baby is moving appropriately and she is having no swelling " at present.     Past Medical History       Patient Active Problem List   Diagnosis     Anxiety     Generalized headache     History of 3 spontaneous abortions     History of gestational diabetes mellitus     History of migraine     Encounter for supervision of normal first pregnancy     Gestational diabetes mellitus     Obesity        Past Surgical History     Past Surgical History:   Procedure Laterality Date     D & C       DILATION AND CURETTAGE SUCTION N/A 12/7/2021    Procedure: SUCTION DILATION AND CURETTAGE;  Surgeon: Giuliana Black MD;  Location: [facility] Main OR     KNEE SURGERY Left      OTHER SURGICAL HISTORY Left 2014    BENIGN TUMER REMOVAL LEFT LEG.       Family History     No family history on file.    Social History     Social History     Tobacco Use     Smoking status: Never     Smokeless tobacco: Never   Vaping Use     Vaping Use: Never used   Substance Use Topics     Alcohol use: Not Currently     Drug use: Never       Review of Systems     Patient has no polyuria or polydipsia, no chest pain, dyspnea or TIA's, no numbness, tingling or pain in extremities  Remainder negative except as noted in HPI.      Vital Signs     /60 (BP Location: Right arm, Patient Position: Sitting, Cuff Size: Adult Large)   Pulse 115   Wt 102.3 kg (225 lb 8 oz)   SpO2 97%   BMI 38.71 kg/m    Wt Readings from Last 3 Encounters:   06/29/23 102.3 kg (225 lb 8 oz)   06/15/23 100.8 kg (222 lb 4.8 oz)   06/01/23 100.7 kg (222 lb)       Physical Exam     GENERAL: Pleasant, alert, appropriate appearance for age. No acute distress,   HEENT: Normocephalic, atraumatic  NECK: normal in appearance  CHEST/RESPIRATORY: Normal chest wall and respirations.   ABDOMEN: Gravid   SKIN: No melanosis,  ecchymosis,  vitiligo. No acanthosis nigricans  NEURO:  Non-focal, no tremor.  PSYCH: Alert and oriented -appropriate affect. Orientation, judgement and memory appear intact.  MSK: No joint abnormalities, FROM in all four  extremities. No kyphosis      Assessment     1. Insulin controlled gestational diabetes mellitus (GDM) in second trimester        Plan       1. GESTATIONAL DIABETES-  Adjust dose as follows:     -NPH [id number]   units. Increase by 2 units every 2 days to keep fasting blood glucose below 95mg/dL  -Novolog 0  units with breakfast  -Novolog 0 units with lunch   -Novolog 0 units with dinner  -Increase by 0 units every 2 days to keep 1 hour after meal blood glucose less than 140mg/dL     We reviewed glucose goals of fasting blood glucose <95 mg/dL and 1 hour post prandial blood glucose of <140 mg/dL.    Monitor blood sugar 4 times daily: Fasting  and 1 hour after each meal.  Contact  this clinic [phone] if blood glucose is not within the above-mentioned goals.      We discussed the importance of excellent glycemic control during pregnancy to limit complications such as fetal macrosomia, shoulder dystocia,  hypoglycemia and hyperbilirubinemia.  I have discussed the patient's increased risk of recurrent GDM and/or development of type 2 diabetes later in life.       F/u in 2 weeks.      Irma Chen NP  2023        Lab Results     Hemoglobin A1c_EXT   Date Value Ref Range Status   2020 4.8 <5.7 % Final       No results found for: CHOL, HDL, TRIG, CHOLHDL          Current Medications           Answers for HPI/ROS submitted by the patient on 2023  General Symptoms: No  Skin Symptoms: No  HENT Symptoms: No  EYE SYMPTOMS: No  HEART SYMPTOMS: No  LUNG SYMPTOMS: No  INTESTINAL SYMPTOMS: No  URINARY SYMPTOMS: No  GYNECOLOGIC SYMPTOMS: No  BREAST SYMPTOMS: No  SKELETAL SYMPTOMS: No  BLOOD SYMPTOMS: No  NERVOUS SYSTEM SYMPTOMS: No  MENTAL HEALTH SYMPTOMS: No          Again, thank you for allowing me to participate in the care of your patient.        Sincerely,        Irma Chen NP

## 2023-06-29 NOTE — PROGRESS NOTES
"  [facility]  ENDOCRINOLOGY    Gestational Diabetes 2023    Winsome Edwards, 1995, [id number]          Reason for visit      1. Insulin controlled gestational diabetes mellitus (GDM) in second trimester        HPI     Winsome Edwards is a very pleasant 27 year old old female who presents for GESTATIONAL Diabetes Mellitus.  She is currently 22 weeks pregnant . Due date is 10/5/23  Diagnosed with GDM based on an OGTT. She hashad  GDM in prior pregnancies.   Current carbohydrate intake:consistent with recommendations of 30g-60g-60g.  I have reviewed her blood glucose logs and note that the:  Fasting readings  are:in range on current regimen  Postprandial readings are:in range on current regimen  Current NPH dose: 10  Current Prandial insulin: 0  Blood glucose logs/meter brought in and data reviewed and incorporated into decision-making.  Planned delivery at: [facility]   OBGYN: [facility]  Therapy/Interventions in the past:  She has been seen by the Diabetes Educator- and has received instruction on carbohydrate counting and  consistency.  Records from referring provider and other sources have also been reviewed and incorporated into decision-making.      TODAY:    Renita returns today in f/u for GDM . We are joined by CHRIS Yi. Pt continues to do a stellar job with her management. At a recent growth US, she was told that \"baby's head was small\", however they also said that baby was in breech position and that it was an odd angle for them to try and measure.  She has been drinking Protein shakes to try and get more Protein into her diet. She is drinking [employer] brand, and putting in powdered PB.  Baby is moving appropriately and she is having no swelling at present.     Past Medical History       Patient Active Problem List   Diagnosis     Anxiety     Generalized headache     History of 3 spontaneous abortions     History of gestational diabetes mellitus     History of migraine     Encounter for supervision " of normal first pregnancy     Gestational diabetes mellitus     Obesity        Past Surgical History     Past Surgical History:   Procedure Laterality Date     D & C       DILATION AND CURETTAGE SUCTION N/A 12/7/2021    Procedure: SUCTION DILATION AND CURETTAGE;  Surgeon: Giuliana Black MD;  Location: [facility] Main OR     KNEE SURGERY Left      OTHER SURGICAL HISTORY Left 2014    BENIGN TUMER REMOVAL LEFT LEG.       Family History     No family history on file.    Social History     Social History     Tobacco Use     Smoking status: Never     Smokeless tobacco: Never   Vaping Use     Vaping Use: Never used   Substance Use Topics     Alcohol use: Not Currently     Drug use: Never       Review of Systems     Patient has no polyuria or polydipsia, no chest pain, dyspnea or TIA's, no numbness, tingling or pain in extremities  Remainder negative except as noted in HPI.  Answers for HPI/ROS submitted by the patient on 6/29/2023  General Symptoms: No  Skin Symptoms: No  HENT Symptoms: No  EYE SYMPTOMS: No  HEART SYMPTOMS: No  LUNG SYMPTOMS: No  INTESTINAL SYMPTOMS: No  URINARY SYMPTOMS: No  GYNECOLOGIC SYMPTOMS: No  BREAST SYMPTOMS: No  SKELETAL SYMPTOMS: No  BLOOD SYMPTOMS: No  NERVOUS SYSTEM SYMPTOMS: No  MENTAL HEALTH SYMPTOMS: No          Vital Signs     /60 (BP Location: Right arm, Patient Position: Sitting, Cuff Size: Adult Large)   Pulse 115   Wt 102.3 kg (225 lb 8 oz)   SpO2 97%   BMI 38.71 kg/m    Wt Readings from Last 3 Encounters:   06/29/23 102.3 kg (225 lb 8 oz)   06/15/23 100.8 kg (222 lb 4.8 oz)   06/01/23 100.7 kg (222 lb)       Physical Exam     GENERAL: Pleasant, alert, appropriate appearance for age. No acute distress,   HEENT: Normocephalic, atraumatic  NECK: normal in appearance  CHEST/RESPIRATORY: Normal chest wall and respirations.   ABDOMEN: Gravid   SKIN: No melanosis,  ecchymosis,  vitiligo. No acanthosis nigricans  NEURO:  Non-focal, no tremor.  PSYCH: Alert and oriented  -appropriate affect. Orientation, judgement and memory appear intact.  MSK: No joint abnormalities, FROM in all four extremities. No kyphosis      Assessment     1. Insulin controlled gestational diabetes mellitus (GDM) in second trimester        Plan       1. GESTATIONAL DIABETES-  Adjust dose as follows:     -NPH [id number]   units. Increase by 2 units every 2 days to keep fasting blood glucose below 95mg/dL  -Novolog 0  units with breakfast  -Novolog 0 units with lunch   -Novolog 0 units with dinner  -Increase by 0 units every 2 days to keep 1 hour after meal blood glucose less than 140mg/dL     We reviewed glucose goals of fasting blood glucose <95 mg/dL and 1 hour post prandial blood glucose of <140 mg/dL.    Monitor blood sugar 4 times daily: Fasting  and 1 hour after each meal.  Contact  this clinic [phone] if blood glucose is not within the above-mentioned goals.      We discussed the importance of excellent glycemic control during pregnancy to limit complications such as fetal macrosomia, shoulder dystocia,  hypoglycemia and hyperbilirubinemia.  I have discussed the patient's increased risk of recurrent GDM and/or development of type 2 diabetes later in life.       F/u in 2 weeks.      Irma Chen NP  2023        Lab Results     Hemoglobin A1c_EXT   Date Value Ref Range Status   2020 4.8 <5.7 % Final       No results found for: CHOL, HDL, TRIG, CHOLHDL          Current Medications

## 2023-07-05 NOTE — PROGRESS NOTES
Diabetes Self-Management and Care Support    Renita is here alone today for her follow up on Gestational Diabetes.  She is currently taking insulin to help control her glucose.  She is feeling good.  Stated she is not having swelling in her hands, not having swelling in her feet and her baby is moving well.  She had an ultrasound recently and her daughter is measuring in the 18th precentile.  Her parents closed on their home and have been spending time there.  Stated this has relieved some of her stress.    OB-Mies  EDC-10.05.23-26 weeks 0 days  Pregnancy number-3  OGTT-102/199/--/--  Previous GDM-No    Current insulin doses  Basal NPH 10 units  Prandial not at this time    Current blood sugars              Plan-  Continue with current dose.  Increase by 2 units every 2 times fasting reading is over 95.  Call if there is any change in readings before next appointment.  Follow up scheduled for 2 weeks with endocrinology       The service provided today was under the supervising provider, Irma Chen, who was available if needed.     Jenniffer Carrizales RN, [facility]  [phone]  In Clinic Tuesday, Wednesday, Thursday  In person   DSMT 30 minutes

## 2023-07-13 ENCOUNTER — TELEPHONE (OUTPATIENT)
Dept: ENDOCRINOLOGY | Facility: CLINIC | Age: 28
End: 2023-07-13

## 2023-07-13 ENCOUNTER — APPOINTMENT (OUTPATIENT)
Dept: EDUCATION SERVICES | Facility: CLINIC | Age: 28
End: 2023-07-13
Payer: COMMERCIAL

## 2023-07-13 ENCOUNTER — OFFICE VISIT (OUTPATIENT)
Dept: ENDOCRINOLOGY | Facility: CLINIC | Age: 28
End: 2023-07-13
Payer: COMMERCIAL

## 2023-07-13 VITALS
OXYGEN SATURATION: 96 % | DIASTOLIC BLOOD PRESSURE: 68 MMHG | WEIGHT: 170.4 LBS | BODY MASS INDEX: 29.25 KG/M2 | HEART RATE: 105 BPM | SYSTOLIC BLOOD PRESSURE: 120 MMHG

## 2023-07-13 DIAGNOSIS — O24.414 INSULIN CONTROLLED GESTATIONAL DIABETES MELLITUS (GDM) IN SECOND TRIMESTER: ICD-10-CM

## 2023-07-13 DIAGNOSIS — O24.414 INSULIN CONTROLLED GESTATIONAL DIABETES MELLITUS (GDM) IN SECOND TRIMESTER: Primary | ICD-10-CM

## 2023-07-13 PROCEDURE — 95251 CONT GLUC MNTR ANALYSIS I&R: CPT | Performed by: NURSE PRACTITIONER

## 2023-07-13 PROCEDURE — 99214 OFFICE O/P EST MOD 30 MIN: CPT | Performed by: NURSE PRACTITIONER

## 2023-07-13 RX ORDER — INSULIN LISPRO 100 [IU]/ML
INJECTION, SOLUTION INTRAVENOUS; SUBCUTANEOUS
Qty: 15 ML | Refills: 3 | Status: SHIPPED | OUTPATIENT
Start: 2023-07-13 | End: 2023-07-13

## 2023-07-13 RX ORDER — INSULIN ASPART 100 [IU]/ML
INJECTION, SOLUTION INTRAVENOUS; SUBCUTANEOUS
Qty: 15 ML | Refills: 3 | Status: ON HOLD | OUTPATIENT
Start: 2023-07-13 | End: 2023-10-03

## 2023-07-13 RX ORDER — INSULIN LISPRO 100 [IU]/ML
INJECTION, SOLUTION INTRAVENOUS; SUBCUTANEOUS
Qty: 15 ML | Refills: 3 | Status: ON HOLD | OUTPATIENT
Start: 2023-07-13 | End: 2023-10-03

## 2023-07-13 RX ORDER — PEN NEEDLE, DIABETIC 30 GX3/16"
NEEDLE, DISPOSABLE MISCELLANEOUS
Qty: 100 EACH | Refills: 3 | Status: SHIPPED | OUTPATIENT
Start: 2023-07-13

## 2023-07-13 NOTE — LETTER
2023         RE: Winsome Edwards  [address] [facility] [address]        Dear Colleague,    Thank you for referring your patient, Winsome Edwards, to the [facility]. Please see a copy of my visit note below.    [facility]  ENDOCRINOLOGY    Gestational Diabetes 2023    Winsome Edwards, 1995, [id number]          Reason for visit      1. Insulin controlled gestational diabetes mellitus (GDM) in second trimester        HPI     Winsome Edwards is a very pleasant 27 year old old female who presents for GESTATIONAL Diabetes Mellitus.  She is currently 22 weeks pregnant . Due date is 10/5/23  Diagnosed with GDM based on an OGTT. She hashad  GDM in prior pregnancies.   Current carbohydrate intake:consistent with recommendations of 30g-60g-60g.  I have reviewed her blood glucose logs and note that the:  Fasting readings  are:in range on current regimen  Postprandial readings are:in range on current regimen  Current NPH dose: 10  Current Prandial insulin: 0  Blood glucose logs/meter brought in and data reviewed and incorporated into decision-making.  Planned delivery at: [facility]   OBGYN: [facility]  Therapy/Interventions in the past:  She has been seen by the Diabetes Educator- and has received instruction on carbohydrate counting and  consistency.  Records from referring provider and other sources have also been reviewed and incorporated into decision-making.      TODAY:    Renita returns today in f/u for GDM . We are joined by CHRIS Yi. Dexcom download only has 2 days on it this time. She does test when she doesn't have the sensor on. Noted, she is having some elevations after she eats a breakfast sandwich. We will give her some prandial insulin to take when she eats one. Otherwise, she continues to do quite well. Baby is moving appropriately and she is having no swelling.     Past Medical History       Patient Active Problem List   Diagnosis     Anxiety      Generalized headache     History of 3 spontaneous abortions     History of gestational diabetes mellitus     History of migraine     Encounter for supervision of normal first pregnancy     Gestational diabetes mellitus     Obesity        Past Surgical History     Past Surgical History:   Procedure Laterality Date     D & C       DILATION AND CURETTAGE SUCTION N/A 12/7/2021    Procedure: SUCTION DILATION AND CURETTAGE;  Surgeon: Giuliana Black MD;  Location: [facility] Main OR     KNEE SURGERY Left      OTHER SURGICAL HISTORY Left 2014    BENIGN TUMER REMOVAL LEFT LEG.       Family History     No family history on file.    Social History     Social History     Tobacco Use     Smoking status: Never     Smokeless tobacco: Never   Vaping Use     Vaping Use: Never used   Substance Use Topics     Alcohol use: Not Currently     Drug use: Never       Review of Systems     Patient has no polyuria or polydipsia, no chest pain, dyspnea or TIA's, no numbness, tingling or pain in extremities  Remainder negative except as noted in HPI.      Vital Signs     /68 (BP Location: Right arm, Patient Position: Sitting, Cuff Size: Adult Large)   Pulse 105   Wt 77.3 kg (170 lb 6.4 oz)   SpO2 96%   BMI 29.25 kg/m    Wt Readings from Last 3 Encounters:   07/13/23 77.3 kg (170 lb 6.4 oz)   06/29/23 102.3 kg (225 lb 8 oz)   06/15/23 100.8 kg (222 lb 4.8 oz)       Physical Exam     GENERAL: Pleasant, alert, appropriate appearance for age. No acute distress,   HEENT: Normocephalic, atraumatic  NECK: normal in appearance  CHEST/RESPIRATORY: Normal chest wall and respirations.   ABDOMEN: Gravid   SKIN: No melanosis,  ecchymosis,  vitiligo. No acanthosis nigricans  NEURO:  Non-focal, no tremor.  PSYCH: Alert and oriented -appropriate affect. Orientation, judgement and memory appear intact.  MSK: No joint abnormalities, FROM in all four extremities. No kyphosis      Assessment     1. Insulin controlled gestational diabetes mellitus  (GDM) in second trimester        Plan     1. GESTATIONAL DIABETES-  Adjust dose as follows:     -NPH [id number]   units. Increase by 2 units every 2 days to keep fasting blood glucose below 95mg/dL  -Novolog 0  units with breakfast  -Novolog 0 units with lunch   -Novolog 0 units with dinner  -Increase by 0 units every 2 days to keep 1 hour after meal blood glucose less than 140mg/dL   Add 4 units with Breakfast [place]  We reviewed glucose goals of fasting blood glucose <95 mg/dL and 1 hour post prandial blood glucose of <140 mg/dL.    Monitor blood sugar 4 times daily: Fasting  and 1 hour after each meal.  Contact  this clinic [phone] if blood glucose is not within the above-mentioned goals.      We discussed the importance of excellent glycemic control during pregnancy to limit complications such as fetal macrosomia, shoulder dystocia,  hypoglycemia and hyperbilirubinemia.  I have discussed the patient's increased risk of recurrent GDM and/or development of type 2 diabetes later in life.       F/u in 2 weeks.        Irma Chen NP  2023        Lab Results     Hemoglobin A1c_EXT   Date Value Ref Range Status   2020 4.8 <5.7 % Final       No results found for: CHOL, HDL, TRIG, CHOLHDL    [unfilled]      Current Medications               Again, thank you for allowing me to participate in the care of your patient.        Sincerely,        Irma Chen NP

## 2023-07-13 NOTE — TELEPHONE ENCOUNTER
[facility] Call Center    Phone Message    May a detailed message be left on voicemail: yes     Reason for Call: Medication Question or concern regarding medication   Prescription Clarification  Name of Medication: insulin lispro (HUMALOG KWIKPEN) 100 UNIT/ML (1 unit dial) KWIKPEN [[id number]]  Prescribing Provider: Irma Chen NP   Pharmacy: [facility] #[id number] [address]   What on the order needs clarification?Clarification on dosage and frequency          Action Taken: Other: endo    Travel Screening: Not Applicable

## 2023-07-13 NOTE — PROGRESS NOTES
[facility]  ENDOCRINOLOGY    Gestational Diabetes 2023    Winsome Edwards, 1995, [id number]          Reason for visit      1. Insulin controlled gestational diabetes mellitus (GDM) in second trimester        HPI     Winsome Edwards is a very pleasant 27 year old old female who presents for GESTATIONAL Diabetes Mellitus.  She is currently 22 weeks pregnant . Due date is 10/5/23  Diagnosed with GDM based on an OGTT. She hashad  GDM in prior pregnancies.   Current carbohydrate intake:consistent with recommendations of 30g-60g-60g.  I have reviewed her blood glucose logs and note that the:  Fasting readings  are:in range on current regimen  Postprandial readings are:in range on current regimen  Current NPH dose: 10  Current Prandial insulin: 0  Blood glucose logs/meter brought in and data reviewed and incorporated into decision-making.  [facility]  Therapy/Interventions in the past:  She has been seen by the Diabetes Educator- and has received instruction on carbohydrate counting and  consistency.  Records from referring provider and other sources have also been reviewed and incorporated into decision-making.      TODAY:    Renita returns today in f/u for GDM . We are joined by CHRIS Yi. Dexcom download only has 2 days on it this time. She does test when she doesn't have the sensor on. Noted, she is having some elevations after she eats a breakfast sandwich. We will give her some prandial insulin to take when she eats one. Otherwise, she continues to do quite well. Baby is moving appropriately and she is having no swelling.     Past Medical History       Patient Active Problem List   Diagnosis     Anxiety     Generalized headache     History of 3 spontaneous abortions     History of gestational diabetes mellitus     History of migraine     Encounter for supervision of normal first pregnancy     Gestational diabetes mellitus     Obesity        Past Surgical History      Past Surgical History:   Procedure Laterality Date     D & C       DILATION AND CURETTAGE SUCTION N/A 12/7/2021    Procedure: SUCTION DILATION AND CURETTAGE;  Surgeon: Giuliana Black MD;  Location: [facility] Main OR     KNEE SURGERY Left      OTHER SURGICAL HISTORY Left 2014    BENIGN TUMER REMOVAL LEFT LEG.       Family History     No family history on file.    Social History     Social History     Tobacco Use     Smoking status: Never     Smokeless tobacco: Never   Vaping Use     Vaping Use: Never used   Substance Use Topics     Alcohol use: Not Currently     Drug use: Never       Review of Systems     Patient has no polyuria or polydipsia, no chest pain, dyspnea or TIA's, no numbness, tingling or pain in extremities  Remainder negative except as noted in HPI.      Vital Signs     /68 (BP Location: Right arm, Patient Position: Sitting, Cuff Size: Adult Large)   Pulse 105   Wt 77.3 kg (170 lb 6.4 oz)   SpO2 96%   BMI 29.25 kg/m    Wt Readings from Last 3 Encounters:   07/13/23 77.3 kg (170 lb 6.4 oz)   06/29/23 102.3 kg (225 lb 8 oz)   06/15/23 100.8 kg (222 lb 4.8 oz)       Physical Exam     GENERAL: Pleasant, alert, appropriate appearance for age. No acute distress,   HEENT: Normocephalic, atraumatic  NECK: normal in appearance  CHEST/RESPIRATORY: Normal chest wall and respirations.   ABDOMEN: Gravid   SKIN: No melanosis,  ecchymosis,  vitiligo. No acanthosis nigricans  NEURO:  Non-focal, no tremor.  PSYCH: Alert and oriented -appropriate affect. Orientation, judgement and memory appear intact.  MSK: No joint abnormalities, FROM in all four extremities. No kyphosis      Assessment     1. Insulin controlled gestational diabetes mellitus (GDM) in second trimester        Plan     1. GESTATIONAL DIABETES-  Adjust dose as follows:     -NPH [id number]   units. Increase by 2 units every 2 days to keep fasting blood glucose below 95mg/dL  -Novolog 0  units with breakfast  -Novolog 0 units with lunch    -Novolog 0 units with dinner  -Increase by 0 units every 2 days to keep 1 hour after meal blood glucose less than 140mg/dL   Add 4 units with Breakfast [place]  We reviewed glucose goals of fasting blood glucose <95 mg/dL and 1 hour post prandial blood glucose of <140 mg/dL.    Monitor blood sugar 4 times daily: Fasting  and 1 hour after each meal.  Contact  this clinic [phone] if blood glucose is not within the above-mentioned goals.      We discussed the importance of excellent glycemic control during pregnancy to limit complications such as fetal macrosomia, shoulder dystocia,  hypoglycemia and hyperbilirubinemia.  I have discussed the patient's increased risk of recurrent GDM and/or development of type 2 diabetes later in life.       F/u in 2 weeks.        Irma Chen NP  2023        Lab Results     Hemoglobin A1c_EXT   Date Value Ref Range Status   2020 4.8 <5.7 % Final       No results found for: CHOL, HDL, TRIG, CHOLHDL    [unfilled]      Current Medications

## 2023-07-18 ENCOUNTER — TRANSFERRED RECORDS (OUTPATIENT)
Dept: HEALTH INFORMATION MANAGEMENT | Facility: CLINIC | Age: 28
End: 2023-07-18
Payer: COMMERCIAL

## 2023-07-18 ENCOUNTER — TRANSCRIBE ORDERS (OUTPATIENT)
Dept: MATERNAL FETAL MEDICINE | Facility: HOSPITAL | Age: 28
End: 2023-07-18
Payer: COMMERCIAL

## 2023-07-18 ENCOUNTER — MEDICAL CORRESPONDENCE (OUTPATIENT)
Dept: HEALTH INFORMATION MANAGEMENT | Facility: CLINIC | Age: 28
End: 2023-07-18
Payer: COMMERCIAL

## 2023-07-18 DIAGNOSIS — O26.90 PREGNANCY RELATED CONDITION, ANTEPARTUM: Primary | ICD-10-CM

## 2023-07-19 ENCOUNTER — PRE VISIT (OUTPATIENT)
Dept: MATERNAL FETAL MEDICINE | Facility: HOSPITAL | Age: 28
End: 2023-07-19
Payer: COMMERCIAL

## 2023-07-20 ENCOUNTER — OFFICE VISIT (OUTPATIENT)
Dept: MATERNAL FETAL MEDICINE | Facility: HOSPITAL | Age: 28
End: 2023-07-20
Attending: OBSTETRICS & GYNECOLOGY
Payer: COMMERCIAL

## 2023-07-20 ENCOUNTER — ANCILLARY PROCEDURE (OUTPATIENT)
Dept: ULTRASOUND IMAGING | Facility: HOSPITAL | Age: 28
End: 2023-07-20
Attending: OBSTETRICS & GYNECOLOGY
Payer: COMMERCIAL

## 2023-07-20 DIAGNOSIS — Z03.74 FETAL GROWTH PROBLEM SUSPECTED BUT NOT FOUND: Primary | ICD-10-CM

## 2023-07-20 DIAGNOSIS — O24.414 INSULIN CONTROLLED GESTATIONAL DIABETES MELLITUS (GDM) IN THIRD TRIMESTER: ICD-10-CM

## 2023-07-20 DIAGNOSIS — O26.90 PREGNANCY RELATED CONDITION, ANTEPARTUM: ICD-10-CM

## 2023-07-20 PROCEDURE — 76811 OB US DETAILED SNGL FETUS: CPT

## 2023-07-20 PROCEDURE — 99207 PR NO CHARGE LOS: CPT | Performed by: OBSTETRICS & GYNECOLOGY

## 2023-07-20 PROCEDURE — 76811 OB US DETAILED SNGL FETUS: CPT | Mod: 26 | Performed by: OBSTETRICS & GYNECOLOGY

## 2023-07-20 NOTE — NURSING NOTE
Patient reports active fetal movement, denies pain,  contractions, leaking of fluid, or bleeding.  Reports blood sugar values fasting range mid 80's-90's and  hr post prandial values are in range. Taking 10 units currently of insulin, planning to increase to 12. Taking 4units with breakfast.  Patient denies headache, visual changes, nausea/vomiting, epigastric pain related to preeclampsia.  Education provided to patient on FGR surveillance if indicated.  SBAR given to LATIA RIVERA, see their note in Epic.

## 2023-07-20 NOTE — PROGRESS NOTES
Please see the imaging tab for details of the ultrasound performed today.    Monica Fernando MD  Specialist in Maternal-Fetal Medicine

## 2023-07-27 ENCOUNTER — OFFICE VISIT (OUTPATIENT)
Dept: ENDOCRINOLOGY | Facility: CLINIC | Age: 28
End: 2023-07-27
Payer: COMMERCIAL

## 2023-07-27 ENCOUNTER — ALLIED HEALTH/NURSE VISIT (OUTPATIENT)
Dept: EDUCATION SERVICES | Facility: CLINIC | Age: 28
End: 2023-07-27
Payer: COMMERCIAL

## 2023-07-27 VITALS
BODY MASS INDEX: 38.88 KG/M2 | OXYGEN SATURATION: 95 % | WEIGHT: 226.5 LBS | HEART RATE: 96 BPM | DIASTOLIC BLOOD PRESSURE: 52 MMHG | SYSTOLIC BLOOD PRESSURE: 118 MMHG

## 2023-07-27 DIAGNOSIS — O24.414 INSULIN CONTROLLED GESTATIONAL DIABETES MELLITUS (GDM) DURING PREGNANCY, ANTEPARTUM: Primary | ICD-10-CM

## 2023-07-27 DIAGNOSIS — O24.414 INSULIN CONTROLLED GESTATIONAL DIABETES MELLITUS (GDM) IN THIRD TRIMESTER: Primary | ICD-10-CM

## 2023-07-27 PROCEDURE — 99214 OFFICE O/P EST MOD 30 MIN: CPT | Performed by: NURSE PRACTITIONER

## 2023-07-27 PROCEDURE — G0108 DIAB MANAGE TRN  PER INDIV: HCPCS

## 2023-07-27 NOTE — PROGRESS NOTES
"[facility]  ENDOCRINOLOGY    Gestational Diabetes 2023    Winsome Edwards, 1995, [id number]          Reason for visit      1. Insulin controlled gestational diabetes mellitus (GDM) in third trimester        HPI     Winsome Edwards is a very pleasant 27 year old old female who presents for GESTATIONAL Diabetes Mellitus.  She is currently 30w1d  pregnant . Due date is 10/5/23  Diagnosed with GDM based on an OGTT. She hashad  GDM in prior pregnancies.   Current carbohydrate intake:consistent with recommendations of 30g-60g-60g.  I have reviewed her blood glucose logs and note that the:  Fasting readings  are:in range on current regimen  Postprandial readings are:in range on current regimen  Current NPH dose: 10  Current Prandial insulin: 4  Blood glucose logs/meter brought in and data reviewed and incorporated into decision-making.  [facility]  Therapy/Interventions in the past:  She has been seen by the Diabetes Educator- and has received instruction on carbohydrate counting and  consistency.  Records from referring provider and other sources have also been reviewed and incorporated into decision-making.      TODAY:    Renita returns today in f/u for GDM . We are joined by CHRIS Yi. She reports a \"very stressful last 10 days\". She is has been taking her prandial insulin more often.  She had 3 high dinners.  She feels that \"things should be better soon\".  FBS still look OK. Baby is moving appropriately and she is having no swelling. There was some concern regarding baby's size, so she was sent to [facility] for a scan, and was reassured that baby's growth was fine.     Past Medical History       Patient Active Problem List   Diagnosis    Anxiety    Generalized headache    History of 3 spontaneous abortions    History of gestational diabetes mellitus    History of migraine    Encounter for supervision of normal first pregnancy    Gestational diabetes mellitus    Obesity    "     Past Surgical History     Past Surgical History:   Procedure Laterality Date    D & C      DILATION AND CURETTAGE SUCTION N/A 12/7/2021    Procedure: SUCTION DILATION AND CURETTAGE;  Surgeon: Giuliana Black MD;  Location: [facility] Main OR    KNEE SURGERY Left     OTHER SURGICAL HISTORY Left 2014    BENIGN TUMER REMOVAL LEFT LEG.       Family History     No family history on file.    Social History     Social History     Tobacco Use    Smoking status: Never    Smokeless tobacco: Never   Vaping Use    Vaping Use: Never used   Substance Use Topics    Alcohol use: Not Currently    Drug use: Never       Review of Systems     Patient has no polyuria or polydipsia, no chest pain, dyspnea or TIA's, no numbness, tingling or pain in extremities  Remainder negative except as noted in HPI.      Vital Signs     /52 (BP Location: Right arm, Patient Position: Sitting, Cuff Size: Adult Large)   Pulse 96   Wt 102.7 kg (226 lb 8 oz)   LMP 12/29/2022   SpO2 95%   BMI 38.88 kg/m    Wt Readings from Last 3 Encounters:   07/27/23 102.7 kg (226 lb 8 oz)   07/13/23 77.3 kg (170 lb 6.4 oz)   06/29/23 102.3 kg (225 lb 8 oz)       Physical Exam     GENERAL: Pleasant, alert, appropriate appearance for age. No acute distress,   HEENT: Normocephalic, atraumatic  NECK: normal in appearance  CHEST/RESPIRATORY: Normal chest wall and respirations.   ABDOMEN: Gravid   SKIN: No melanosis,  ecchymosis,  vitiligo. No acanthosis nigricans  NEURO:  Non-focal, no tremor.  PSYCH: Alert and oriented -appropriate affect. Orientation, judgement and memory appear intact.  MSK: No joint abnormalities, FROM in all four extremities. No kyphosis    Assessment     1. Insulin controlled gestational diabetes mellitus (GDM) in third trimester        Plan     1. GESTATIONAL DIABETES-  Adjust dose as follows:     -NPH [id number]   units. Increase by 2 units every 2 days to keep fasting blood glucose below 95mg/dL  -Novolog 4 units with breakfast  PRN  -Novolog 4 units with lunch PRN   -Novolog 4 units with dinner PRN  -Increase by 0 units every 2 days to keep 1 hour after meal blood glucose less than 140mg/dL     We reviewed glucose goals of fasting blood glucose <95 mg/dL and 1 hour post prandial blood glucose of <140 mg/dL.    Monitor blood sugar 4 times daily: Fasting  and 1 hour after each meal.  Contact  this clinic [phone] if blood glucose is not within the above-mentioned goals.      We discussed the importance of excellent glycemic control during pregnancy to limit complications such as fetal macrosomia, shoulder dystocia,  hypoglycemia and hyperbilirubinemia.  I have discussed the patient's increased risk of recurrent GDM and/or development of type 2 diabetes later in life.       F/u in 2 weeks.        Irma Chen NP  2023    Lab Results     Hemoglobin A1c_EXT   Date Value Ref Range Status   2020 4.8 <5.7 % Final       No results found for: CHOL, HDL, TRIG, CHOLHDL          Current Medications

## 2023-07-27 NOTE — Clinical Note
7/27/2023         RE: Winsome Edwards  [address] [address]        Dear Colleague,    Thank you for referring your patient, Winsome Edwards, to the [facility]. Please see a copy of my visit note below.                                                      Again, thank you for allowing me to participate in the care of your patient.        Sincerely,        Irma Chen NP

## 2023-07-27 NOTE — LETTER
7/27/2023         RE: Winsome Edwards  [address] [facility] [address]        Dear Colleague,    Thank you for referring your patient, Winsome Edwards, to the [facility]. Please see a copy of my visit note below.    Diabetes Self-Management and Care Support    Renita is here alone today for her follow up on Gestational Diabetes.  She is currently taking insulin to help control her glucose.  She is feeling stressed.  Stated she is not having swelling in her hands, not having swelling in her feet and her baby is moving well.  Stated she had an ultrasound last week and was told her baby had not grown since last scan.  She had another scan with [facility] and was told everything is looking good.  Her parents have moved out, their dogs are still living with Renita and her family.      OB-Mies  EDC-10.05.23-30 weeks  Pregnancy number-4  OGTT-102/199/--/--  Previous GDM-No    Current insulin doses  Basal NPH 10 units  Prandial Novolog 4 units as needed    Current blood sugars              Plan-  Start taking Novolog 4 units before each meal.  Call if there is any change in readings before next appointment.  Follow up scheduled for Will follow up via [facility] next week.  If FBG running high discuss increasing NPH.  Follow up with endocrinology in two weeks.       The service provided today was under the supervising provider, Irma Chen, who was available if needed.     Jenniffer Carrizales RN, [facility]  [phone]  In Clinic Tuesday, Wednesday, Thursday  In person   DSMT 30 minutes

## 2023-07-28 NOTE — PROGRESS NOTES
Diabetes Self-Management and Care Support    Renita is here alone today for her follow up on Gestational Diabetes.  She is currently taking insulin to help control her glucose.  She is feeling stressed.  Stated she is not having swelling in her hands, not having swelling in her feet and her baby is moving well.  Stated she had an ultrasound last week and was told her baby had not grown since last scan.  She had another scan with [facility] and was told everything is looking good.  Her parents have moved out, their dogs are still living with Renita and her family.      OB-[facility]ies  EDC-10.05.23-30 weeks  Pregnancy number-4  OGTT-102/199/--/--  Previous GDM-No    Current insulin doses  Basal NPH 10 units  Prandial Novolog 4 units as needed    Current blood sugars              Plan-  Start taking Novolog 4 units before each meal.  Call if there is any change in readings before next appointment.  Follow up scheduled for [facility] follow up via [facility] next week.  If FBG running high discuss increasing NPH.  Follow up with endocrinology in two weeks.       The service provided today was under the supervising provider, Irma Chen, who was available if needed.     Jenniffer Carrizales RN, [facility]  [phone]  In Clinic Tuesday, Wednesday, Thursday  In person   DSMT 30 minutes

## 2023-08-02 ENCOUNTER — LAB REQUISITION (OUTPATIENT)
Dept: LAB | Facility: CLINIC | Age: 28
End: 2023-08-02

## 2023-08-02 DIAGNOSIS — Z34.91 ENCOUNTER FOR SUPERVISION OF NORMAL PREGNANCY, UNSPECIFIED, FIRST TRIMESTER: ICD-10-CM

## 2023-08-02 PROCEDURE — 86592 SYPHILIS TEST NON-TREP QUAL: CPT | Performed by: PHYSICIAN ASSISTANT

## 2023-08-03 LAB — RPR SER QL: NONREACTIVE

## 2023-08-10 ENCOUNTER — ALLIED HEALTH/NURSE VISIT (OUTPATIENT)
Dept: EDUCATION SERVICES | Facility: CLINIC | Age: 28
End: 2023-08-10
Payer: COMMERCIAL

## 2023-08-10 ENCOUNTER — OFFICE VISIT (OUTPATIENT)
Dept: ENDOCRINOLOGY | Facility: CLINIC | Age: 28
End: 2023-08-10
Payer: COMMERCIAL

## 2023-08-10 VITALS
BODY MASS INDEX: 39.07 KG/M2 | HEART RATE: 102 BPM | WEIGHT: 227.6 LBS | SYSTOLIC BLOOD PRESSURE: 118 MMHG | OXYGEN SATURATION: 98 % | DIASTOLIC BLOOD PRESSURE: 64 MMHG

## 2023-08-10 DIAGNOSIS — O24.414 INSULIN CONTROLLED GESTATIONAL DIABETES MELLITUS (GDM) DURING PREGNANCY, ANTEPARTUM: Primary | ICD-10-CM

## 2023-08-10 DIAGNOSIS — O24.414 INSULIN CONTROLLED GESTATIONAL DIABETES MELLITUS (GDM) IN THIRD TRIMESTER: Primary | ICD-10-CM

## 2023-08-10 PROCEDURE — 95251 CONT GLUC MNTR ANALYSIS I&R: CPT | Performed by: NURSE PRACTITIONER

## 2023-08-10 PROCEDURE — 99214 OFFICE O/P EST MOD 30 MIN: CPT | Performed by: NURSE PRACTITIONER

## 2023-08-10 PROCEDURE — G0108 DIAB MANAGE TRN  PER INDIV: HCPCS

## 2023-08-10 NOTE — LETTER
8/10/2023         RE: Winsome Edwards  [address] [place] [facility] [address]        Dear Colleague,    Thank you for referring your patient, Winsome Edwards, to the [facility]. Please see a copy of my visit note below.    [facility]  ENDOCRINOLOGY    Gestational Diabetes 8/10/2023    Winsome Edwards, 1995, [id number]          Reason for visit      1. Insulin controlled gestational diabetes mellitus (GDM) in third trimester        HPI     Winsome Edwards is a very pleasant 27 year old old female who presents for GESTATIONAL Diabetes Mellitus.  She is currently 32w1d  pregnant . Due date is 10/5/23  Diagnosed with GDM based on an OGTT. She hashad  GDM in prior pregnancies.   Current carbohydrate intake:consistent with recommendations of 30g-60g-60g.  I have reviewed her blood glucose logs and note that the:  Fasting readings  are:in range on current regimen  Postprandial readings are:in range on current regimen  Current NPH dose: 10  Current Prandial insulin: 4 PRN  Blood glucose logs/meter brought in and data reviewed and incorporated into decision-making.  Planned delivery at: [facility]   OBGYN: [facility]    Therapy/Interventions in the past:  She has been seen by the Diabetes Educator- and has received instruction on carbohydrate counting and  consistency.  Records from referring provider and other sources have also been reviewed and incorporated into decision-making.      TODAY:    Renita returns today in f/u for GDM . We are joined by CHRIS Yi. Pt's  is also in attendance. She provides BG today and with one exception (sweetcorn bites with honey) everything looks pretty good. CGM download shows that she had just one or two above 140 elevations. TIR was 98% (). Baby is moving appropriately and she is having no swelling at present.     Past Medical History       Patient Active Problem List   Diagnosis     Anxiety     Generalized headache     History of 3  spontaneous abortions     History of gestational diabetes mellitus     History of migraine     Encounter for supervision of normal first pregnancy     Gestational diabetes mellitus     Obesity        Past Surgical History     Past Surgical History:   Procedure Laterality Date     D & C       DILATION AND CURETTAGE SUCTION N/A 12/7/2021    Procedure: SUCTION DILATION AND CURETTAGE;  Surgeon: Giuliana Black MD;  Location: [facility] Main OR     KNEE SURGERY Left      OTHER SURGICAL HISTORY Left 2014    BENIGN TUMER REMOVAL LEFT LEG.       Family History     No family history on file.    Social History     Social History     Tobacco Use     Smoking status: Never     Smokeless tobacco: Never   Vaping Use     Vaping Use: Never used   Substance Use Topics     Alcohol use: Not Currently     Drug use: Never       Review of Systems     Patient has no polyuria or polydipsia, no chest pain, dyspnea or TIA's, no numbness, tingling or pain in extremities  Remainder negative except as noted in HPI.      Vital Signs     /64 (BP Location: Right arm, Patient Position: Sitting, Cuff Size: Adult Large)   Pulse 102   Wt 103.2 kg (227 lb 9.6 oz)   LMP 12/29/2022   SpO2 98%   BMI 39.07 kg/m    Wt Readings from Last 3 Encounters:   08/10/23 103.2 kg (227 lb 9.6 oz)   07/27/23 102.7 kg (226 lb 8 oz)   07/13/23 77.3 kg (170 lb 6.4 oz)       Physical Exam     GENERAL: Pleasant, alert, appropriate appearance for age. No acute distress,   HEENT: Normocephalic, atraumatic  NECK: normal in appearance  CHEST/RESPIRATORY: Normal chest wall and respirations.   ABDOMEN: Gravid   SKIN: No melanosis,  ecchymosis,  vitiligo. No acanthosis nigricans  NEURO:  Non-focal, no tremor.  PSYCH: Alert and oriented -appropriate affect. Orientation, judgement and memory appear intact.  MSK: No joint abnormalities, FROM in all four extremities. No kyphosis    Assessment     1. Insulin controlled gestational diabetes mellitus (GDM) in third  trimester        Plan     1. GESTATIONAL DIABETES-  Adjust dose as follows:     -NPH [id number]   units. Increase by 2 units every 2 days to keep fasting blood glucose below 95mg/dL  -Novolog 4 units with breakfast PRN  -Novolog 4 units with lunch PRN   -Novolog 4 units with dinner PRN  -Increase by 0 units every 2 days to keep 1 hour after meal blood glucose less than 140mg/dL     We reviewed glucose goals of fasting blood glucose <95 mg/dL and 1 hour post prandial blood glucose of <140 mg/dL.    Monitor blood sugar 4 times daily: Fasting  and 1 hour after each meal.  Contact  this clinic [phone] if blood glucose is not within the above-mentioned goals.      We discussed the importance of excellent glycemic control during pregnancy to limit complications such as fetal macrosomia, shoulder dystocia,  hypoglycemia and hyperbilirubinemia.  I have discussed the patient's increased risk of recurrent GDM and/or development of type 2 diabetes later in life.       F/u in 2 weeks.        Irma Chen NP  8/10/2023        Lab Results     Hemoglobin A1c_EXT   Date Value Ref Range Status   2020 4.8 <5.7 % Final       No results found for: CHOL, HDL, TRIG, CHOLHDL    [unfilled]      Current Medications                   Again, thank you for allowing me to participate in the care of your patient.        Sincerely,        Irma Chen NP

## 2023-08-10 NOTE — PROGRESS NOTES
[facility]  ENDOCRINOLOGY    Gestational Diabetes 8/10/2023    Winsome Edwards, 1995, [id number]          Reason for visit      1. Insulin controlled gestational diabetes mellitus (GDM) in third trimester        HPI     Winsome Edwards is a very pleasant 27 year old old female who presents for GESTATIONAL Diabetes Mellitus.  She is currently 32w1d  pregnant . Due date is 10/5/23  Diagnosed with GDM based on an OGTT. She hashad  GDM in prior pregnancies.   Current carbohydrate intake:consistent with recommendations of 30g-60g-60g.  I have reviewed her blood glucose logs and note that the:  Fasting readings  are:in range on current regimen  Postprandial readings are:in range on current regimen  Current NPH dose: 10  Current Prandial insulin: 4 PRN  Blood glucose logs/meter brought in and data reviewed and incorporated into decision-making.  [facility]    Therapy/Interventions in the past:  She has been seen by the Diabetes Educator- and has received instruction on carbohydrate counting and  consistency.  Records from referring provider and other sources have also been reviewed and incorporated into decision-making.      TODAY:    Renita returns today in f/u for GDM . We are joined by CHRIS Yi. Pt's  is also in attendance. She provides BG today and with one exception (sweetcorn bites with honey) everything looks pretty good. CGM download shows that she had just one or two above 140 elevations. TIR was 98% (). Baby is moving appropriately and she is having no swelling at present.     Past Medical History       Patient Active Problem List   Diagnosis    Anxiety    Generalized headache    History of 3 spontaneous abortions    History of gestational diabetes mellitus    History of migraine    Encounter for supervision of normal first pregnancy    Gestational diabetes mellitus    Obesity        Past Surgical History     Past Surgical History:   Procedure  Laterality Date    D & C      DILATION AND CURETTAGE SUCTION N/A 12/7/2021    Procedure: SUCTION DILATION AND CURETTAGE;  Surgeon: Giuliana Black MD;  Location: [facility] Main OR    KNEE SURGERY Left     OTHER SURGICAL HISTORY Left 2014    BENIGN TUMER REMOVAL LEFT LEG.       Family History     No family history on file.    Social History     Social History     Tobacco Use    Smoking status: Never    Smokeless tobacco: Never   Vaping Use    Vaping Use: Never used   Substance Use Topics    Alcohol use: Not Currently    Drug use: Never       Review of Systems     Patient has no polyuria or polydipsia, no chest pain, dyspnea or TIA's, no numbness, tingling or pain in extremities  Remainder negative except as noted in HPI.      Vital Signs     /64 (BP Location: Right arm, Patient Position: Sitting, Cuff Size: Adult Large)   Pulse 102   Wt 103.2 kg (227 lb 9.6 oz)   LMP 12/29/2022   SpO2 98%   BMI 39.07 kg/m    Wt Readings from Last 3 Encounters:   08/10/23 103.2 kg (227 lb 9.6 oz)   07/27/23 102.7 kg (226 lb 8 oz)   07/13/23 77.3 kg (170 lb 6.4 oz)       Physical Exam     GENERAL: Pleasant, alert, appropriate appearance for age. No acute distress,   HEENT: Normocephalic, atraumatic  NECK: normal in appearance  CHEST/RESPIRATORY: Normal chest wall and respirations.   ABDOMEN: Gravid   SKIN: No melanosis,  ecchymosis,  vitiligo. No acanthosis nigricans  NEURO:  Non-focal, no tremor.  PSYCH: Alert and oriented -appropriate affect. Orientation, judgement and memory appear intact.  MSK: No joint abnormalities, FROM in all four extremities. No kyphosis    Assessment     1. Insulin controlled gestational diabetes mellitus (GDM) in third trimester        Plan     1. GESTATIONAL DIABETES-  Adjust dose as follows:     -NPH [id number]   units. Increase by 2 units every 2 days to keep fasting blood glucose below 95mg/dL  -Novolog 4 units with breakfast PRN  -Novolog 4 units with lunch PRN   -Novolog 4 units with  dinner PRN  -Increase by 0 units every 2 days to keep 1 hour after meal blood glucose less than 140mg/dL     We reviewed glucose goals of fasting blood glucose <95 mg/dL and 1 hour post prandial blood glucose of <140 mg/dL.    Monitor blood sugar 4 times daily: Fasting  and 1 hour after each meal.  Contact  this clinic [phone] if blood glucose is not within the above-mentioned goals.      We discussed the importance of excellent glycemic control during pregnancy to limit complications such as fetal macrosomia, shoulder dystocia,  hypoglycemia and hyperbilirubinemia.  I have discussed the patient's increased risk of recurrent GDM and/or development of type 2 diabetes later in life.       F/u in 2 weeks.        Irma Chen NP  8/10/2023        Lab Results     Hemoglobin A1c_EXT   Date Value Ref Range Status   2020 4.8 <5.7 % Final       No results found for: CHOL, HDL, TRIG, CHOLHDL    [unfilled]      Current Medications

## 2023-08-10 NOTE — LETTER
8/10/2023         RE: Winsome Edwards  [address] [facility] [address]        Dear Colleague,    Thank you for referring your patient, Winsome Edwards, to the [facility]. Please see a copy of my visit note below.    Diabetes Self-Management and Care Support    Renita is here with her , Paulino, for her follow up on Gestational Diabetes.  She is currently taking insulin to help control her glucose.  She is feeling well.  Stated she is not having swelling in her hands, not having swelling in her feet and her baby is moving well.  Stated her parents dogs are now out of the house which has reduced her stress levels considerably.  Stated her allergies have been acting up, stated her OB recommended she take Zyrtec to help with this.    OB-Meis  EDC-10.05.23-32 weeks 0 days  Pregnancy number-4  Previous GDM-No    Current insulin doses  Basal NPH 10 units  Prandial Novolog 4 units before breakfast, lunch and dinner as needed    Current blood sugars  See dexcom report in endocrinology report        Plan-  Continue with current dose.  Increase by 2 units every tow times reading is above goal.Call if there is any change in readings before next appointment.  Follow up scheduled for 2 weeks with endocrinology       The service provided today was under the supervising provider, Irma Chen, who was available if needed.       Jenniffer Carrizales RN, [facility]  [phone]  In Clinic Tuesday, Wednesday, Thursday  In person   DSMT 30 minutes

## 2023-08-24 ENCOUNTER — ALLIED HEALTH/NURSE VISIT (OUTPATIENT)
Dept: EDUCATION SERVICES | Facility: CLINIC | Age: 28
End: 2023-08-24
Payer: COMMERCIAL

## 2023-08-24 ENCOUNTER — OFFICE VISIT (OUTPATIENT)
Dept: ENDOCRINOLOGY | Facility: CLINIC | Age: 28
End: 2023-08-24
Payer: COMMERCIAL

## 2023-08-24 VITALS
BODY MASS INDEX: 39.62 KG/M2 | DIASTOLIC BLOOD PRESSURE: 62 MMHG | HEART RATE: 95 BPM | SYSTOLIC BLOOD PRESSURE: 114 MMHG | OXYGEN SATURATION: 97 % | WEIGHT: 230.8 LBS

## 2023-08-24 DIAGNOSIS — O24.414 INSULIN CONTROLLED GESTATIONAL DIABETES MELLITUS (GDM) IN THIRD TRIMESTER: Primary | ICD-10-CM

## 2023-08-24 DIAGNOSIS — O24.414 INSULIN CONTROLLED GESTATIONAL DIABETES MELLITUS (GDM) DURING PREGNANCY, ANTEPARTUM: Primary | ICD-10-CM

## 2023-08-24 PROCEDURE — G0108 DIAB MANAGE TRN  PER INDIV: HCPCS

## 2023-08-24 PROCEDURE — 99213 OFFICE O/P EST LOW 20 MIN: CPT | Performed by: NURSE PRACTITIONER

## 2023-08-24 NOTE — PROGRESS NOTES
"[facility]  ENDOCRINOLOGY    Gestational Diabetes 2023    Winsome Edwards, 1995, [id number]          Reason for visit      1. Insulin controlled gestational diabetes mellitus (GDM) in third trimester        HPI     Winsome Edwards is a very pleasant 27 year old old female who presents for GESTATIONAL Diabetes Mellitus.  She is currently 34w1d  pregnant . Due date is 10/5/23  Diagnosed with GDM based on an OGTT. She hashad  GDM in prior pregnancies.   Current carbohydrate intake:consistent with recommendations of 30g-60g-60g.  I have reviewed her blood glucose logs and note that the:  Fasting readings  are:in range on current regimen  Postprandial readings are:in range on current regimen  Current NPH dose: 10  Current Prandial insulin: 4 units, will take more with extra CHO  Blood glucose logs/meter brought in and data reviewed and incorporated into decision-making.  Planned delivery at: [facility]   OBGYN: [facility]  Therapy/Interventions in the past:  She has been seen by the Diabetes Educator- and has received instruction on carbohydrate counting and  consistency.  Records from referring provider and other sources have also been reviewed and incorporated into decision-making.      TODAY:    Renita returns today in f/u for GDM . We are joined by CHRIS Yi. Pt's  is also in attendance. She had some post breakfast lows this week. She reports that it was the sensor malfunctioning, because she \"showered it\" during the 12 hr warm up time and it was not happy with her. BG continue to look good. Baby is moving appropriately and she is having no swelling. She reports that baby is cephalic in position, and that OB is happy with baby's weight.     Past Medical History       Patient Active Problem List   Diagnosis    Anxiety    Generalized headache    History of 3 spontaneous abortions    History of gestational diabetes mellitus    History of migraine    Encounter for supervision of normal first pregnancy "    Gestational diabetes mellitus    Obesity        Past Surgical History     Past Surgical History:   Procedure Laterality Date    D & C      DILATION AND CURETTAGE SUCTION N/A 12/7/2021    Procedure: SUCTION DILATION AND CURETTAGE;  Surgeon: Giuliana Black MD;  Location: [facility] Main OR    KNEE SURGERY Left     OTHER SURGICAL HISTORY Left 2014    BENIGN TUMER REMOVAL LEFT LEG.       Family History     No family history on file.    Social History     Social History     Tobacco Use    Smoking status: Never    Smokeless tobacco: Never   Vaping Use    Vaping Use: Never used   Substance Use Topics    Alcohol use: Not Currently    Drug use: Never       Review of Systems     Patient has no polyuria or polydipsia, no chest pain, dyspnea or TIA's, no numbness, tingling or pain in extremities  Remainder negative except as noted in HPI.      Vital Signs     /62 (BP Location: Right arm, Patient Position: Sitting, Cuff Size: Adult Large)   Pulse 95   Wt 104.7 kg (230 lb 12.8 oz)   LMP 12/29/2022   SpO2 97%   BMI 39.62 kg/m    Wt Readings from Last 3 Encounters:   08/24/23 104.7 kg (230 lb 12.8 oz)   08/10/23 103.2 kg (227 lb 9.6 oz)   07/27/23 102.7 kg (226 lb 8 oz)       Physical Exam     GENERAL: Pleasant, alert, appropriate appearance for age. No acute distress,   HEENT: Normocephalic, atraumatic  NECK: normal in appearance  CHEST/RESPIRATORY: Normal chest wall and respirations.   ABDOMEN: Gravid   SKIN: No melanosis,  ecchymosis,  vitiligo. No acanthosis nigricans  NEURO:  Non-focal, no tremor.  PSYCH: Alert and oriented -appropriate affect. Orientation, judgement and memory appear intact.  MSK: No joint abnormalities, FROM in all four extremities. No kyphosis    Assessment     1. Insulin controlled gestational diabetes mellitus (GDM) in third trimester        Plan     1. GESTATIONAL DIABETES-  Adjust dose as follows:     -NPH [id number]   units. Increase by 2 units every 2 days to keep fasting blood  glucose below 95mg/dL  -Novolog 4 units with breakfast   -Novolog 4 units with lunch    -Novolog 4 units with dinner   -Increase by 0 units every 2 days to keep 1 hour after meal blood glucose less than 140mg/dL     We reviewed glucose goals of fasting blood glucose <95 mg/dL and 1 hour post prandial blood glucose of <140 mg/dL.    Monitor blood sugar 4 times daily: Fasting  and 1 hour after each meal.  Contact  this clinic [phone] if blood glucose is not within the above-mentioned goals.      We discussed the importance of excellent glycemic control during pregnancy to limit complications such as fetal macrosomia, shoulder dystocia,  hypoglycemia and hyperbilirubinemia.  I have discussed the patient's increased risk of recurrent GDM and/or development of type 2 diabetes later in life.       F/u in 2 weeks.           Irma Chen NP  2023        Lab Results     Hemoglobin A1c_EXT   Date Value Ref Range Status   2020 4.8 <5.7 % Final       No results found for: CHOL, HDL, TRIG, CHOLHDL    [unfilled]      Current Medications       Answers submitted by the patient for this visit:  Symptoms you have experienced in the last 30 days (Submitted on 2023)  General Symptoms: No  Skin Symptoms: No  HENT Symptoms: No  EYE SYMPTOMS: No  HEART SYMPTOMS: No  LUNG SYMPTOMS: No  INTESTINAL SYMPTOMS: No  URINARY SYMPTOMS: No  GYNECOLOGIC SYMPTOMS: No  BREAST SYMPTOMS: No  SKELETAL SYMPTOMS: No  BLOOD SYMPTOMS: No  NERVOUS SYSTEM SYMPTOMS: No  MENTAL HEALTH SYMPTOMS: No

## 2023-08-24 NOTE — LETTER
8/24/2023         RE: Winsome Edwards  [address] [address]        Dear Colleague,    Thank you for referring your patient, Winsoem Edwards, to the [facility]. Please see a copy of my visit note below.    Diabetes Self-Management and Care Support    Renita is here with her , Paulino, today for her follow up on Gestational Diabetes.  She is currently taking insulin to help control her glucose.  She is feeling good.  Stated she is not having swelling in her hands, not having swelling in her feet and her baby is moving well.  Stated they have not said anything else about her size and Dr. Black has no concerns.      OB-[facility]  EDC-10.05.23-34 weeks 0 days  Pregnancy number-4  OGTT-102/199/--/--  Previous GDM-No    Current insulin doses  Basal NPH 10 units  Prandial Nvolog 4 units with higher carb meals    Current blood sugars  8.24-94  8.23-98/117/141/114  8.22-85/111/118/119  8.21-85/125/136/104  8.20-86/127/124/92  8.19-82/119/112/103        Plan-  Continue with current doses.  Increase basal by 2 units every two times fasting glucose is 95 or above.  If meal time readings are increasing, increase dose of Novolog or take more consistently.  Call if there is any change in readings before next appointment.  Follow up scheduled for 2 weeks       The service provided today was under the supervising provider, Irma Chen, who was available if needed.     Jenniffer Carrizales RN, [facility]  [phone]  In Clinic Tuesday, Wednesday, Thursday  In person   DSMT 30 minutes

## 2023-08-24 NOTE — LETTER
"    2023         RE: Winsome Edwards  [address] [facility] [address]        Dear Colleague,    Thank you for referring your patient, Winsome Edwards, to the [facility]. Please see a copy of my visit note below.    [facility]  ENDOCRINOLOGY    Gestational Diabetes 2023    Winsome Edwards, 1995, [id number]          Reason for visit      1. Insulin controlled gestational diabetes mellitus (GDM) in third trimester        HPI     Winsome Edwards is a very pleasant 27 year old old female who presents for GESTATIONAL Diabetes Mellitus.  She is currently 34w1d  pregnant . Due date is 10/5/23  Diagnosed with GDM based on an OGTT. She hashad  GDM in prior pregnancies.   Current carbohydrate intake:consistent with recommendations of 30g-60g-60g.  I have reviewed her blood glucose logs and note that the:  Fasting readings  are:in range on current regimen  Postprandial readings are:in range on current regimen  Current NPH dose: 10  Current Prandial insulin: 4 units, will take more with extra CHO  Blood glucose logs/meter brought in and data reviewed and incorporated into decision-making.  Planned delivery at: [facility]   OBGYN: [facility]  Therapy/Interventions in the past:  She has been seen by the Diabetes Educator- and has received instruction on carbohydrate counting and  consistency.  Records from referring provider and other sources have also been reviewed and incorporated into decision-making.      TODAY:    Renita returns today in f/u for GDM . We are joined by CHRIS Yi. Pt's  is also in attendance. She had some post breakfast lows this week. She reports that it was the sensor malfunctioning, because she \"showered it\" during the 12 hr warm up time and it was not happy with her. BG continue to look good. Baby is moving appropriately and she is having no swelling. She reports that baby is cephalic in position, and that OB is happy with baby's weight.     Past " Medical History       Patient Active Problem List   Diagnosis     Anxiety     Generalized headache     History of 3 spontaneous abortions     History of gestational diabetes mellitus     History of migraine     Encounter for supervision of normal first pregnancy     Gestational diabetes mellitus     Obesity        Past Surgical History     Past Surgical History:   Procedure Laterality Date     D & C       DILATION AND CURETTAGE SUCTION N/A 12/7/2021    Procedure: SUCTION DILATION AND CURETTAGE;  Surgeon: Giuliana Black MD;  Location: [facility] Main OR     KNEE SURGERY Left      OTHER SURGICAL HISTORY Left 2014    BENIGN TUMER REMOVAL LEFT LEG.       Family History     No family history on file.    Social History     Social History     Tobacco Use     Smoking status: Never     Smokeless tobacco: Never   Vaping Use     Vaping Use: Never used   Substance Use Topics     Alcohol use: Not Currently     Drug use: Never       Review of Systems     Patient has no polyuria or polydipsia, no chest pain, dyspnea or TIA's, no numbness, tingling or pain in extremities  Remainder negative except as noted in HPI.      Vital Signs     /62 (BP Location: Right arm, Patient Position: Sitting, Cuff Size: Adult Large)   Pulse 95   Wt 104.7 kg (230 lb 12.8 oz)   LMP 12/29/2022   SpO2 97%   BMI 39.62 kg/m    Wt Readings from Last 3 Encounters:   08/24/23 104.7 kg (230 lb 12.8 oz)   08/10/23 103.2 kg (227 lb 9.6 oz)   07/27/23 102.7 kg (226 lb 8 oz)       Physical Exam     GENERAL: Pleasant, alert, appropriate appearance for age. No acute distress,   HEENT: Normocephalic, atraumatic  NECK: normal in appearance  CHEST/RESPIRATORY: Normal chest wall and respirations.   ABDOMEN: Gravid   SKIN: No melanosis,  ecchymosis,  vitiligo. No acanthosis nigricans  NEURO:  Non-focal, no tremor.  PSYCH: Alert and oriented -appropriate affect. Orientation, judgement and memory appear intact.  MSK: No joint abnormalities, FROM in all four  extremities. No kyphosis    Assessment     1. Insulin controlled gestational diabetes mellitus (GDM) in third trimester        Plan     1. GESTATIONAL DIABETES-  Adjust dose as follows:     -NPH [id number]   units. Increase by 2 units every 2 days to keep fasting blood glucose below 95mg/dL  -Novolog 4 units with breakfast   -Novolog 4 units with lunch    -Novolog 4 units with dinner   -Increase by 0 units every 2 days to keep 1 hour after meal blood glucose less than 140mg/dL     We reviewed glucose goals of fasting blood glucose <95 mg/dL and 1 hour post prandial blood glucose of <140 mg/dL.    Monitor blood sugar 4 times daily: Fasting  and 1 hour after each meal.  Contact  this clinic [phone] if blood glucose is not within the above-mentioned goals.      We discussed the importance of excellent glycemic control during pregnancy to limit complications such as fetal macrosomia, shoulder dystocia,  hypoglycemia and hyperbilirubinemia.  I have discussed the patient's increased risk of recurrent GDM and/or development of type 2 diabetes later in life.       F/u in 2 weeks.           Irma Chen NP  2023        Lab Results     Hemoglobin A1c_EXT   Date Value Ref Range Status   2020 4.8 <5.7 % Final       No results found for: CHOL, HDL, TRIG, CHOLHDL    [unfilled]      Current Medications       Answers submitted by the patient for this visit:  Symptoms you have experienced in the last 30 days (Submitted on 2023)  General Symptoms: No  Skin Symptoms: No  HENT Symptoms: No  EYE SYMPTOMS: No  HEART SYMPTOMS: No  LUNG SYMPTOMS: No  INTESTINAL SYMPTOMS: No  URINARY SYMPTOMS: No  GYNECOLOGIC SYMPTOMS: No  BREAST SYMPTOMS: No  SKELETAL SYMPTOMS: No  BLOOD SYMPTOMS: No  NERVOUS SYSTEM SYMPTOMS: No  MENTAL HEALTH SYMPTOMS: No      Again, thank you for allowing me to participate in the care of your patient.        Sincerely,        Irma Chen NP

## 2023-08-30 NOTE — PROGRESS NOTES
Diabetes Self-Management and Care Support    Renita is here with her , Paulino, today for her follow up on Gestational Diabetes.  She is currently taking insulin to help control her glucose.  She is feeling good.  Stated she is not having swelling in her hands, not having swelling in her feet and her baby is moving well.  Stated they have not said anything else about her size and Dr. Black has no concerns.      OB-[facility]  EDC-10.05.23-34 weeks 0 days  Pregnancy number-4  OGTT-102/199/--/--  Previous GDM-No    Current insulin doses  Basal NPH 10 units  Prandial Nvolog 4 units with higher carb meals    Current blood sugars  8.24-94  8.23-98/117/141/114  8.22-85/111/118/119  8.21-85/125/136/104  8.20-86/127/124/92  8.19-82/119/112/103        Plan-  Continue with current doses.  Increase basal by 2 units every two times fasting glucose is 95 or above.  If meal time readings are increasing, increase dose of Novolog or take more consistently.  Call if there is any change in readings before next appointment.  Follow up scheduled for 2 weeks       The service provided today was under the supervising provider, Irma Chen, who was available if needed.     Jenniffer Carrizales RN, [facility]  [phone]  In Clinic Tuesday, Wednesday, Thursday  In person   DSMT 30 minutes

## 2023-09-06 ENCOUNTER — LAB REQUISITION (OUTPATIENT)
Dept: LAB | Facility: CLINIC | Age: 28
End: 2023-09-06
Payer: COMMERCIAL

## 2023-09-06 DIAGNOSIS — Z3A.35 35 WEEKS GESTATION OF PREGNANCY: ICD-10-CM

## 2023-09-06 PROCEDURE — 87653 STREP B DNA AMP PROBE: CPT | Mod: ORL | Performed by: OBSTETRICS & GYNECOLOGY

## 2023-09-07 ENCOUNTER — OFFICE VISIT (OUTPATIENT)
Dept: ENDOCRINOLOGY | Facility: CLINIC | Age: 28
End: 2023-09-07
Payer: COMMERCIAL

## 2023-09-07 ENCOUNTER — ALLIED HEALTH/NURSE VISIT (OUTPATIENT)
Dept: EDUCATION SERVICES | Facility: CLINIC | Age: 28
End: 2023-09-07
Payer: COMMERCIAL

## 2023-09-07 VITALS
HEART RATE: 118 BPM | BODY MASS INDEX: 39.48 KG/M2 | SYSTOLIC BLOOD PRESSURE: 102 MMHG | OXYGEN SATURATION: 97 % | WEIGHT: 230 LBS | DIASTOLIC BLOOD PRESSURE: 68 MMHG

## 2023-09-07 DIAGNOSIS — O24.414 INSULIN CONTROLLED GESTATIONAL DIABETES MELLITUS (GDM) DURING PREGNANCY, ANTEPARTUM: Primary | ICD-10-CM

## 2023-09-07 DIAGNOSIS — O24.414 INSULIN CONTROLLED GESTATIONAL DIABETES MELLITUS (GDM) IN THIRD TRIMESTER: Primary | ICD-10-CM

## 2023-09-07 LAB — GP B STREP DNA SPEC QL NAA+PROBE: NEGATIVE

## 2023-09-07 PROCEDURE — 99214 OFFICE O/P EST MOD 30 MIN: CPT | Performed by: NURSE PRACTITIONER

## 2023-09-07 PROCEDURE — G0108 DIAB MANAGE TRN  PER INDIV: HCPCS

## 2023-09-07 NOTE — LETTER
2023         RE: Winsome Edwards  [address] [facility] [address]        Dear Colleague,    Thank you for referring your patient, Winsome Edwards, to the [facility]. Please see a copy of my visit note below.    [facility]  ENDOCRINOLOGY    Gestational Diabetes 2023    Winsome Edwards, 1995, [id number]          Reason for visit      1. Insulin controlled gestational diabetes mellitus (GDM) in third trimester        HPI     Winsome Edwards is a very pleasant 27 year old old female who presents for GESTATIONAL Diabetes Mellitus.  She is currently 36w1d  pregnant . Due date is 10/5/23  Diagnosed with GDM based on an OGTT. She hashad  GDM in prior pregnancies.   Current carbohydrate intake:consistent with recommendations of 30g-60g-60g.  I have reviewed her blood glucose logs and note that the:  Fasting readings  are:in range on current regimen  Postprandial readings are:in range on current regimen  Current NPH dose: 14  Current Prandial insulin: 4 units, will take more with extra CHO  Blood glucose logs/meter brought in and data reviewed and incorporated into decision-making.  Planned delivery at: [facility]   OBGYN: [facility]    Therapy/Interventions in the past:  She has been seen by the Diabetes Educator- and has received instruction on carbohydrate counting and  consistency.  Records from referring provider and other sources have also been reviewed and incorporated into decision-making.      TODAY:    Renita returns today in follow-up for GDM. We are joined by pt's  and CHRIS Yi. She was wearing Dexcom Sensor until yesterday. She does bring her logbook however. She has had some post dinner elevations, and has had some FBS elevations. She just increased her HS dose to 14 units last night. We will keep watching her postprandials, but for now, she will continue with 4 units. Baby is moving appropriately and she is having no swelling at present. IOD is  "scheduled for 10/2.  We discussed postpartum instructions today, \"just in case\". She does have one more appointment with us prior to IOD.     Past Medical History       Patient Active Problem List   Diagnosis     Anxiety     Generalized headache     History of 3 spontaneous abortions     History of gestational diabetes mellitus     History of migraine     Encounter for supervision of normal first pregnancy     Gestational diabetes mellitus     Obesity        Past Surgical History     Past Surgical History:   Procedure Laterality Date     D & C       DILATION AND CURETTAGE SUCTION N/A 12/7/2021    Procedure: SUCTION DILATION AND CURETTAGE;  Surgeon: Giuliana Black MD;  Location: [facility] Main OR     KNEE SURGERY Left      OTHER SURGICAL HISTORY Left 2014    BENIGN TUMER REMOVAL LEFT LEG.       Family History     No family history on file.    Social History     Social History     Tobacco Use     Smoking status: Never     Smokeless tobacco: Never   Vaping Use     Vaping Use: Never used   Substance Use Topics     Alcohol use: Not Currently     Drug use: Never       Review of Systems     Patient has no polyuria or polydipsia, no chest pain, dyspnea or TIA's, no numbness, tingling or pain in extremities  Remainder negative except as noted in HPI.      Vital Signs     /68 (BP Location: Right arm, Patient Position: Sitting, Cuff Size: Adult Large)   Pulse 118   Wt 104.3 kg (230 lb)   LMP 12/29/2022   SpO2 97%   BMI 39.48 kg/m    Wt Readings from Last 3 Encounters:   09/07/23 104.3 kg (230 lb)   08/24/23 104.7 kg (230 lb 12.8 oz)   08/10/23 103.2 kg (227 lb 9.6 oz)       Physical Exam     GENERAL: Pleasant, alert, appropriate appearance for age. No acute distress,   HEENT: Normocephalic, atraumatic  NECK: normal in appearance  CHEST/RESPIRATORY: Normal chest wall and respirations.   ABDOMEN: Gravid   SKIN: No melanosis,  ecchymosis,  vitiligo. No acanthosis nigricans  NEURO:  Non-focal, no tremor.  PSYCH: " Alert and oriented -appropriate affect. Orientation, judgement and memory appear intact.  MSK: No joint abnormalities, FROM in all four extremities. No kyphosis    Assessment     1. Insulin controlled gestational diabetes mellitus (GDM) in third trimester        Plan        1. GESTATIONAL DIABETES-  Adjust dose as follows:     -NPH [id number]  units. Increase by 2 units every 2 days to keep fasting blood glucose below 95mg/dL  -Novolog 4 units with breakfast   -Novolog 4 units with lunch    -Novolog 4 units with dinner   -Increase by 0 units every 2 days to keep 1 hour after meal blood glucose less than 140mg/dL     We reviewed glucose goals of fasting blood glucose <95 mg/dL and 1 hour post prandial blood glucose of <140 mg/dL.    Monitor blood sugar 4 times daily: Fasting  and 1 hour after each meal.  Contact  this clinic [phone] if blood glucose is not within the above-mentioned goals.      We discussed the importance of excellent glycemic control during pregnancy to limit complications such as fetal macrosomia, shoulder dystocia,  hypoglycemia and hyperbilirubinemia.  I have discussed the patient's increased risk of recurrent GDM and/or development of type 2 diabetes later in life.       F/up with A1c 3 months postpartum with PCP.  May be a candidate for metformin postpartum as she has more than 1 risk factor for future Type 2 Diabetes Mellitus.  She will need a screening A1c at least annually, TLC- including carbohydrate control and exercise.    After you deliver the baby, it is suggested to check your blood sugar occasionally (1 - 2 times per week) for 6 weeks.   When you are not pregnant, normal blood sugars are:     Fasting (before eating anything in the morning)  - under 100 mg/dl  2 hours after meals under 140  The American Diabetes Association recommends:   a glucose tolerance test 6 weeks after you deliver the baby.       a hemoglobin Alc test yearly after delivery.  The Alc test is a 2-3  month average blood sugar reading.        Discuss getting these tests with your provider.     After delivery, and your provider has said that it is safe to be active, work toward getting 150 minutes each week of physical activity to decrease your risk of  getting type 2 diabetes.     Maintaining a healthy body weight will also decrease your risk of getting type 2 diabetes.     Follow-up in 2 weeks           Irma Chen NP  9/7/2023      Lab Results     Hemoglobin A1c_EXT   Date Value Ref Range Status   05/21/2020 4.8 <5.7 % Final       No results found for: CHOL, HDL, TRIG, CHOLHDL    [unfilled]      Current Medications                 Again, thank you for allowing me to participate in the care of your patient.        Sincerely,        Irma Chen NP

## 2023-09-07 NOTE — PROGRESS NOTES
"[facility]  ENDOCRINOLOGY    Gestational Diabetes 2023    Winsome Edwards, 1995, [id number]          Reason for visit      1. Insulin controlled gestational diabetes mellitus (GDM) in third trimester        HPI     Winsome Edwards is a very pleasant 27 year old old female who presents for GESTATIONAL Diabetes Mellitus.  She is currently 36w1d  pregnant . Due date is 10/5/23  Diagnosed with GDM based on an OGTT. She hashad  GDM in prior pregnancies.   Current carbohydrate intake:consistent with recommendations of 30g-60g-60g.  I have reviewed her blood glucose logs and note that the:  Fasting readings  are:in range on current regimen  Postprandial readings are:in range on current regimen  Current NPH dose: 14  Current Prandial insulin: 4 units, will take more with extra CHO  Blood glucose logs/meter brought in and data reviewed and incorporated into decision-making.  Planned delivery at: [facility]   OBGYN: [facility]    Therapy/Interventions in the past:  She has been seen by the Diabetes Educator- and has received instruction on carbohydrate counting and  consistency.  Records from referring provider and other sources have also been reviewed and incorporated into decision-making.      TODAY:    Renita returns today in follow-up for GDM. We are joined by pt's  and CHRIS Yi. She was wearing Dexcom Sensor until yesterday. She does bring her logbook however. She has had some post dinner elevations, and has had some FBS elevations. She just increased her HS dose to 14 units last night. We will keep watching her postprandials, but for now, she will continue with 4 units. Baby is moving appropriately and she is having no swelling at present. IOD is scheduled for 10/2.  We discussed postpartum instructions today, \"just in case\". She does have one more appointment with us prior to IOD.     Past Medical History       Patient Active Problem List   Diagnosis    Anxiety    Generalized headache    History of 3 " spontaneous abortions    History of gestational diabetes mellitus    History of migraine    Encounter for supervision of normal first pregnancy    Gestational diabetes mellitus    Obesity        Past Surgical History     Past Surgical History:   Procedure Laterality Date    D & C      DILATION AND CURETTAGE SUCTION N/A 12/7/2021    Procedure: SUCTION DILATION AND CURETTAGE;  Surgeon: Giuliana Black MD;  Location: [facility] Main OR    KNEE SURGERY Left     OTHER SURGICAL HISTORY Left 2014    BENIGN TUMER REMOVAL LEFT LEG.       Family History     No family history on file.    Social History     Social History     Tobacco Use    Smoking status: Never    Smokeless tobacco: Never   Vaping Use    Vaping Use: Never used   Substance Use Topics    Alcohol use: Not Currently    Drug use: Never       Review of Systems     Patient has no polyuria or polydipsia, no chest pain, dyspnea or TIA's, no numbness, tingling or pain in extremities  Remainder negative except as noted in HPI.      Vital Signs     /68 (BP Location: Right arm, Patient Position: Sitting, Cuff Size: Adult Large)   Pulse 118   Wt 104.3 kg (230 lb)   LMP 12/29/2022   SpO2 97%   BMI 39.48 kg/m    Wt Readings from Last 3 Encounters:   09/07/23 104.3 kg (230 lb)   08/24/23 104.7 kg (230 lb 12.8 oz)   08/10/23 103.2 kg (227 lb 9.6 oz)       Physical Exam     GENERAL: Pleasant, alert, appropriate appearance for age. No acute distress,   HEENT: Normocephalic, atraumatic  NECK: normal in appearance  CHEST/RESPIRATORY: Normal chest wall and respirations.   ABDOMEN: Gravid   SKIN: No melanosis,  ecchymosis,  vitiligo. No acanthosis nigricans  NEURO:  Non-focal, no tremor.  PSYCH: Alert and oriented -appropriate affect. Orientation, judgement and memory appear intact.  MSK: No joint abnormalities, FROM in all four extremities. No kyphosis    Assessment     1. Insulin controlled gestational diabetes mellitus (GDM) in third trimester        Plan        1.  GESTATIONAL DIABETES-  Adjust dose as follows:     -NPH [id number]  units. Increase by 2 units every 2 days to keep fasting blood glucose below 95mg/dL  -Novolog 4 units with breakfast   -Novolog 4 units with lunch    -Novolog 4 units with dinner   -Increase by 0 units every 2 days to keep 1 hour after meal blood glucose less than 140mg/dL     We reviewed glucose goals of fasting blood glucose <95 mg/dL and 1 hour post prandial blood glucose of <140 mg/dL.    Monitor blood sugar 4 times daily: Fasting  and 1 hour after each meal.  Contact  this clinic [phone] if blood glucose is not within the above-mentioned goals.      We discussed the importance of excellent glycemic control during pregnancy to limit complications such as fetal macrosomia, shoulder dystocia,  hypoglycemia and hyperbilirubinemia.  I have discussed the patient's increased risk of recurrent GDM and/or development of type 2 diabetes later in life.       F/up with A1c 3 months postpartum with PCP.  May be a candidate for metformin postpartum as she has more than 1 risk factor for future Type 2 Diabetes Mellitus.  She will need a screening A1c at least annually, TLC- including carbohydrate control and exercise.    After you deliver the baby, it is suggested to check your blood sugar occasionally (1 - 2 times per week) for 6 weeks.   When you are not pregnant, normal blood sugars are:     Fasting (before eating anything in the morning)  - under 100 mg/dl  2 hours after meals under 140  The American Diabetes Association recommends:   a glucose tolerance test 6 weeks after you deliver the baby.       a hemoglobin Alc test yearly after delivery.  The Alc test is a 2-3 month average blood sugar reading.        Discuss getting these tests with your provider.     After delivery, and your provider has said that it is safe to be active, work toward getting 150 minutes each week of physical activity to decrease your risk of  getting type 2  diabetes.     Maintaining a healthy body weight will also decrease your risk of getting type 2 diabetes.     Follow-up in 2 weeks           Irma Chen NP  9/7/2023      Lab Results     Hemoglobin A1c_EXT   Date Value Ref Range Status   05/21/2020 4.8 <5.7 % Final       No results found for: CHOL, HDL, TRIG, CHOLHDL    [unfilled]      Current Medications

## 2023-09-07 NOTE — LETTER
9/7/2023         RE: Winsome Edwards  [address] [address]        Dear Colleague,    Thank you for referring your patient, Winsome Edwards, to the [facility]. Please see a copy of my visit note below.    Diabetes Self-Management and Care Support    Renita is here with her , Paulino, today for her follow up on Gestational Diabetes.  We were joined by Irma Chen CNP of Endocrinology.  She is currently taking insulin to help control her glucose.  She is feeling good.  Stated she is not having swelling in her hands, not having swelling in her feet and her baby is moving well.  She saw her OB recently and have decided on an induction date of 10.04.23.  This weekend she plans on going to [place] for a family baby shower and weekend.  Discussed ensuring she is not sitting for too long.      OB-[facility]  EDC-10.05.23-36 weeks 0 days  Pregnancy number-4  A1c done 7.20.23-5.6%  Previous GDM-No    Current insulin doses  Basal Nph, increased to 14 units last night  Prandial Novolog 4 units before meals as needed    Current blood sugars  9.07-89/114  9.06-93/120/124/138  9./136/125/107  9./138/100/175  9.03-95/137/134/151  9.02-89/103/115/120      Plan-  Continue increasing NPH by 2 units every two times fasting glucose is 95 or higher.  Increase Novolog as needed.  Call if there is any change in readings before next appointment.  Follow up scheduled for She does have one more appointment scheduled, we will cancel this as she feels comfortable moving forward without it.      Today will be patient's last visit.  Discussed continuing to check glucose 4 times a day and following meal plan until the day of delivery.    After you deliver the baby, it is suggested to check your blood sugar occasionally (1 - 2 times per week) for 6 weeks.   When you are not pregnant, normal blood sugars are:     Fasting (before eating anything in the morning)  - under 100 mg/dl  2 hours after  meals under 140  The American Diabetes Association recommends:   a glucose tolerance test 6 weeks after you deliver the baby.      a hemoglobin Alc test yearly after delivery.  The Alc test is a 2-3 month average blood sugar reading.       Discuss getting these tests with your provider.    After delivery, and your provider has said that it is safe to be active, work toward getting 150 minutes each week of physical activity to decrease your risk of  getting type 2 diabetes.    Maintaining a healthy body weight will also decrease your risk of getting type 2 diabetes.          The service provided today was under the supervising provider, Irma Chen, who was available if needed.       Jenniffer Carrizales RN, [facility]  [phone]  In Clinic Tuesday, Wednesday, Thursday  In person   DSMT 30 minutes

## 2023-09-21 ENCOUNTER — OFFICE VISIT (OUTPATIENT)
Dept: ENDOCRINOLOGY | Facility: CLINIC | Age: 28
End: 2023-09-21
Payer: COMMERCIAL

## 2023-09-21 VITALS
DIASTOLIC BLOOD PRESSURE: 71 MMHG | WEIGHT: 229.8 LBS | OXYGEN SATURATION: 97 % | SYSTOLIC BLOOD PRESSURE: 115 MMHG | BODY MASS INDEX: 39.45 KG/M2 | HEART RATE: 103 BPM

## 2023-09-21 DIAGNOSIS — O24.414 INSULIN CONTROLLED GESTATIONAL DIABETES MELLITUS (GDM) IN THIRD TRIMESTER: Primary | ICD-10-CM

## 2023-09-21 PROCEDURE — 99214 OFFICE O/P EST MOD 30 MIN: CPT | Performed by: NURSE PRACTITIONER

## 2023-09-21 NOTE — PROGRESS NOTES
[facility]  ENDOCRINOLOGY    Gestational Diabetes 2023    Winsome Edwards, 1995, [id number]          Reason for visit      1. Insulin controlled gestational diabetes mellitus (GDM) in third trimester        HPI     Winsome Edwards is a very pleasant 27 year old old female who presents for GESTATIONAL Diabetes Mellitus.  She is currently 38w1d  pregnant . Due date is 10/5/23  Diagnosed with GDM based on an OGTT. She hashad  GDM in prior pregnancies.   Current carbohydrate intake:consistent with recommendations of 30g-60g-60g.  I have reviewed her blood glucose logs and note that the:  Fasting readings  are:in range on current regimen  Postprandial readings are:in range on current regimen  Current NPH dose: 16  Current Prandial insulin: 4 units, will take more with extra CHO  Blood glucose logs/meter brought in and data reviewed and incorporated into decision-making.  Planned delivery at: [facility]   OBGYN: [facility]  Therapy/Interventions in the past:  She has been seen by the Diabetes Educator- and has received instruction on carbohydrate counting and  consistency.  Records from referring provider and other sources have also been reviewed and incorporated into decision-making.      TODAY:    Renita returns today in follow-up for GDM. We are joined CHRIS Yi. Pt is hoping to make it to 39 weeks so her plans for Maternity leave work out. She supplies BG today with both a CGM download and her log book. She had a 73 this morning, but denies any hypoglycemia. There are no current concerns from her OB. Baby is moving appropriately and she is having no swelling. Postpartum instructions given and all questions answered to her satisfaction. She will call this next week with any concerns or problems prior to delivery.     Past Medical History       Patient Active Problem List   Diagnosis    Anxiety    Generalized headache    History of 3 spontaneous abortions    History of gestational diabetes mellitus     History of migraine    Encounter for supervision of normal first pregnancy    Gestational diabetes mellitus    Obesity        Past Surgical History     Past Surgical History:   Procedure Laterality Date    D & C      DILATION AND CURETTAGE SUCTION N/A 12/7/2021    Procedure: SUCTION DILATION AND CURETTAGE;  Surgeon: Giuliana Black MD;  Location: [facility] Main OR    KNEE SURGERY Left     OTHER SURGICAL HISTORY Left 2014    BENIGN TUMER REMOVAL LEFT LEG.       Family History     History reviewed. No pertinent family history.    Social History     Social History     Tobacco Use    Smoking status: Never    Smokeless tobacco: Never   Vaping Use    Vaping Use: Never used   Substance Use Topics    Alcohol use: Not Currently    Drug use: Never       Review of Systems     Patient has no polyuria or polydipsia, no chest pain, dyspnea or TIA's, no numbness, tingling or pain in extremities  Remainder negative except as noted in HPI.      Vital Signs     /71 (BP Location: Left arm, Patient Position: Sitting, Cuff Size: Adult Regular)   Pulse 103   Wt 104.2 kg (229 lb 12.8 oz)   LMP 12/29/2022   SpO2 97%   BMI 39.45 kg/m    Wt Readings from Last 3 Encounters:   09/21/23 104.2 kg (229 lb 12.8 oz)   09/07/23 104.3 kg (230 lb)   08/24/23 104.7 kg (230 lb 12.8 oz)       Physical Exam     GENERAL: Pleasant, alert, appropriate appearance for age. No acute distress,   HEENT: Normocephalic, atraumatic  NECK: normal in appearance  CHEST/RESPIRATORY: Normal chest wall and respirations.   ABDOMEN: Gravid   SKIN: No melanosis,  ecchymosis,  vitiligo. No acanthosis nigricans  NEURO:  Non-focal, no tremor.  PSYCH: Alert and oriented -appropriate affect. Orientation, judgement and memory appear intact.  MSK: No joint abnormalities, FROM in all four extremities. No kyphosis    Assessment     1. Insulin controlled gestational diabetes mellitus (GDM) in third trimester        Plan     1. GESTATIONAL DIABETES-  Adjust dose as  follows:     -NPH insulin 16  units. Increase by 2 units every 2 days to keep fasting blood glucose below 95mg/dL  -Novolog 4 units with breakfast   -Novolog 4 units with lunch    -Novolog 4 units with dinner   -Increase by 0 units every 2 days to keep 1 hour after meal blood glucose less than 140mg/dL     We reviewed glucose goals of fasting blood glucose <95 mg/dL and 1 hour post prandial blood glucose of <140 mg/dL.    Monitor blood sugar 4 times daily: Fasting  and 1 hour after each meal.  Contact  this clinic [phone] if blood glucose is not within the above-mentioned goals.      We discussed the importance of excellent glycemic control during pregnancy to limit complications such as fetal macrosomia, shoulder dystocia,  hypoglycemia and hyperbilirubinemia.  I have discussed the patient's increased risk of recurrent GDM and/or development of type 2 diabetes later in life.       F/up with A1c 3 months postpartum with PCP.  May be a candidate for metformin postpartum as she has more than 1 risk factor for future Type 2 Diabetes Mellitus.  She will need a screening A1c at least annually, TLC- including carbohydrate control and exercise.     After you deliver the baby, it is suggested to check your blood sugar occasionally (1 - 2 times per week) for 6 weeks.   When you are not pregnant, normal blood sugars are:     Fasting (before eating anything in the morning)  - under 100 mg/dl  2 hours after meals under 140  The American Diabetes Association recommends:   a glucose tolerance test 6 weeks after you deliver the baby.       a hemoglobin Alc test yearly after delivery.  The Alc test is a 2-3 month average blood sugar reading.        Discuss getting these tests with your provider.     After delivery, and your provider has said that it is safe to be active, work toward getting 150 minutes each week of physical activity to decrease your risk of  getting type 2 diabetes.     Maintaining a healthy body  weight will also decrease your risk of getting type 2 diabetes.            Irma Chen NP  9/21/2023        Lab Results     Hemoglobin A1c_EXT   Date Value Ref Range Status   05/21/2020 4.8 <5.7 % Final       No results found for: CHOL, HDL, TRIG, CHOLHDL    [unfilled]      Current Medications

## 2023-09-21 NOTE — Clinical Note
"    9/21/2023         RE: Winsome Edwards  [address] [address]        Dear Colleague,    Thank you for referring your patient, Winsome Edwards, to the [facility]. Please see a copy of my visit note below.    [facility]  ENDOCRINOLOGY    Gestational Diabetes 9/21/2023    Winsome Edwards, 1995, [id number]          Reason for visit      No diagnosis found.    HPI     Winsome Edwards is a very pleasant 28 year old old female who presents for GESTATIONAL Diabetes Mellitus.  She is currently***  weeks pregnant GP. Due date is*** 2015  Diagnosed with GDM based on an OGTT. She has{Blank single:62482::\"had\",\"not had\"}  GDM in prior pregnancies.   Current carbohydrate intake:consistent with recommendations of 30g-60g-60g.  I have reviewed her blood glucose logs and note that the:  Fasting readings  are:{Blank single:69206::\"in range\",\"above range\"} on current regimen  Postprandial readings are:{Blank single:39146::\"in range\",\"above range\"} on current regimen  Current NPH dose:  Current Prandial insulin:   Blood glucose logs/meter brought in and data reviewed and incorporated into decision-making.  Planned delivery at:   OBN:     Therapy/Interventions in the past:  She has been seen by the Diabetes Educator- and has received instruction on carbohydrate counting and  consistency.  Records from referring provider and other sources have also been reviewed and incorporated into decision-making.      TODAY:    Past Medical History       Patient Active Problem List   Diagnosis    Anxiety    Generalized headache    History of 3 spontaneous abortions    History of gestational diabetes mellitus    History of migraine    Encounter for supervision of normal first pregnancy    Gestational diabetes mellitus    Obesity        Past Surgical History     Past Surgical History:   Procedure Laterality Date    D & C      DILATION AND CURETTAGE SUCTION N/A 12/7/2021    Procedure: SUCTION " DILATION AND CURETTAGE;  Surgeon: Giuliana Black MD;  Location: [facility] Main OR    KNEE SURGERY Left     OTHER SURGICAL HISTORY Left 2014    BENIGN TUMER REMOVAL LEFT LEG.       Family History     No family history on file.    Social History     Social History     Tobacco Use    Smoking status: Never    Smokeless tobacco: Never   Vaping Use    Vaping Use: Never used   Substance Use Topics    Alcohol use: Not Currently    Drug use: Never       Review of Systems     Patient has {:096212}  Remainder negative except as noted in HPI.      Vital Signs     /71 (BP Location: Left arm, Patient Position: Sitting, Cuff Size: Adult Regular)   Pulse 103   Wt 104.2 kg (229 lb 12.8 oz)   LMP 12/29/2022   SpO2 97%   BMI 39.45 kg/m    Wt Readings from Last 3 Encounters:   09/21/23 104.2 kg (229 lb 12.8 oz)   09/07/23 104.3 kg (230 lb)   08/24/23 104.7 kg (230 lb 12.8 oz)       Physical Exam     GENERAL: Pleasant, alert, appropriate appearance for age. No acute distress,   HEENT: Normocephalic, atraumatic  NECK: normal in appearance  CHEST/RESPIRATORY: Normal chest wall and respirations.   ABDOMEN: Gravid   SKIN: No melanosis,  ecchymosis,  vitiligo. No acanthosis nigricans  NEURO:  Non-focal, no tremor.  PSYCH: Alert and oriented -appropriate affect. Orientation, judgement and memory appear intact.  MSK: No joint abnormalities, FROM in all four extremities. No kyphosis    Assessment     No diagnosis found.    Plan             Irma Chen NP  9/21/2023        Lab Results     Hemoglobin A1c_EXT   Date Value Ref Range Status   05/21/2020 4.8 <5.7 % Final       No results found for: CHOL, HDL, TRIG, CHOLHDL    [unfilled]      Current Medications           Again, thank you for allowing me to participate in the care of your patient.        Sincerely,        Irma Chen NP

## 2023-10-01 PROBLEM — Z34.90 PREGNANCY: Status: ACTIVE | Noted: 2023-10-01

## 2023-10-02 ENCOUNTER — ANESTHESIA EVENT (OUTPATIENT)
Dept: OBGYN | Facility: HOSPITAL | Age: 28
End: 2023-10-02
Payer: COMMERCIAL

## 2023-10-02 ENCOUNTER — ANESTHESIA (OUTPATIENT)
Dept: OBGYN | Facility: HOSPITAL | Age: 28
End: 2023-10-02
Payer: COMMERCIAL

## 2023-10-02 PROCEDURE — 370N000003 HC ANESTHESIA WARD SERVICE: Performed by: ANESTHESIOLOGY

## 2023-10-02 PROCEDURE — 250N000011 HC RX IP 250 OP 636: Mod: JZ | Performed by: ANESTHESIOLOGY

## 2023-10-02 RX ORDER — BUPIVACAINE HYDROCHLORIDE 2.5 MG/ML
INJECTION, SOLUTION EPIDURAL; INFILTRATION; INTRACAUDAL
Status: COMPLETED | OUTPATIENT
Start: 2023-10-02 | End: 2023-10-02

## 2023-10-02 RX ADMIN — BUPIVACAINE HYDROCHLORIDE 6 ML: 2.5 INJECTION, SOLUTION EPIDURAL; INFILTRATION; INTRACAUDAL at 02:08

## 2023-10-02 NOTE — ANESTHESIA PROCEDURE NOTES
Epidural catheter Procedure Note    Pre-Procedure   Staff -        Anesthesiologist:  Salvador Burks MD       Performed By: anesthesiologist       Location: OB       Procedure Start/Stop Times: 10/2/2023 1:45 AM and 10/2/2023 2:08 AM       Pre-Anesthestic Checklist: patient identified, IV checked, risks and benefits discussed, informed consent, monitors and equipment checked and pre-op evaluation  Timeout:       Correct Patient: Yes        Correct Procedure: Yes        Correct Site: Yes        Correct Position: Yes   Procedure Documentation  Procedure: epidural catheter       Diagnosis: Labor       Patient Position: sitting       Patient Prep/Sterile Barriers: sterile gloves, mask, patient draped       Skin prep: Chloraprep       Local skin infiltrated with 5 mL of 1% lidocaine.        Insertion Site: L3-4. (midline approach).       Technique: LORT saline        SARAH at 9 cm.       Needle Type: myOrder       Needle Gauge: 18.        Needle Length (Inches): 3.5        Catheter: 20 G.          Catheter threaded easily.           Threaded 13 cm at skin.         # of attempts: 1 and  # of redirects:     Assessment/Narrative         Paresthesias: No.       Test dose of 5 mL lidocaine 1.5% w/ 1:200,000 epinephrine at 02:02 CDT.         Test dose negative, 3 minutes after injection, for signs of intravascular, subdural, or intrathecal injection.       Insertion/Infusion Method: LORT saline       Aspiration negative for Heme or CSF via Epidural Catheter.    Medication(s) Administered   0.25% Bupivacaine PF (Epidural) - EPIDURAL   6 mL - 10/2/2023 2:08:00 AM  Medication Administration Time: 10/2/2023 1:45 AM     Comments:  After discussion of risks and benefits of a labor epidural including risk of bleeding, hypotension, failed epidural, infection, and headache. The patient agreed to placement of an epidural. Hands washed. The skin over the lower back was prepped and draped using sterile technique with sterile prep,  "drape, gloves, and hat. Sterile prep was allowed to dry for 3 minutes. Lidocaine was used subcutaneously to provide analgesia during placement. Loss of resistance technique was used with saline. The epidural catheter was threaded easily. Negative aspiration of heme/CSF from epidural catheter. Negative test dose of Lidocaine 1.5% with epi. Epidural catheter secured using sterile tegaderm and tape. L&D RN was present the entire procedure.           FOR [facility] ([facility]) ONLY:   Pain Team Contact information: please page the Pain Team Via [employer]. Search \"Pain\". During daytime hours, please page the attending first. At night please page the resident first.      "

## 2023-10-02 NOTE — ANESTHESIA PREPROCEDURE EVALUATION
Anesthesia Pre-Procedure Evaluation    Patient: Winsome Edwards   MRN: [id number] : 1995        Procedure :   Cervical Ripening       Past Medical History:   Diagnosis Date    Chronic pain     Migraine       Past Surgical History:   Procedure Laterality Date    D & C      DILATION AND CURETTAGE SUCTION N/A 2021    Procedure: SUCTION DILATION AND CURETTAGE;  Surgeon: Giuliana Black MD;  Location: [facility] Main OR    KNEE SURGERY Left     OTHER SURGICAL HISTORY Left     BENIGN TUMER REMOVAL LEFT LEG.      Allergies   Allergen Reactions    Vancomycin Other (See Comments)     Lidya syndrome    Adhesive Tape-Silicones [Adhesive Tape] Rash     Localized rash, sesitive skin      Social History     Tobacco Use    Smoking status: Never    Smokeless tobacco: Never   Substance Use Topics    Alcohol use: Not on file      Wt Readings from Last 1 Encounters:   10/01/23 104.8 kg (231 lb)        Anesthesia Evaluation            ROS/MED HX  ENT/Pulmonary:  - neg pulmonary ROS     Neurologic:  - neg neurologic ROS     Cardiovascular:  - neg cardiovascular ROS     METS/Exercise Tolerance:     Hematologic:  - neg hematologic  ROS     Musculoskeletal:       GI/Hepatic:  - neg GI/hepatic ROS     Renal/Genitourinary:       Endo:     (+)               Obesity,       Psychiatric/Substance Use:  - neg psychiatric ROS     Infectious Disease:       Malignancy:       Other:            Physical Exam    Airway             Respiratory Devices and Support         Dental  no notable dental history         Cardiovascular   cardiovascular exam normal          Pulmonary   pulmonary exam normal                OUTSIDE LABS:  CBC:   Lab Results   Component Value Date    WBC 9.6 2023    HGB 13.1 10/01/2023    HGB 13.5 2023    HCT 42.8 2023     2023     2020     BMP:   Lab Results   Component Value Date     (H) 10/02/2023     (H) 10/01/2023     COAGS: No results found for:  PTT, INR, FIBR  POC: No results found for: BGM, HCG, HCGS  HEPATIC: No results found for: ALBUMIN, PROTTOTAL, ALT, AST, GGT, ALKPHOS, BILITOTAL, BILIDIRECT, JOSSIE  OTHER:   Lab Results   Component Value Date    A1C 5.3 04/25/2023       Anesthesia Plan    ASA Status:  3       Anesthesia Type: Epidural.              Consents    Anesthesia Plan(s) and associated risks, benefits, and realistic alternatives discussed. Questions answered and patient/representative(s) expressed understanding.     - Discussed:     - Discussed with:  Patient, Spouse            Postoperative Care            Comments:    Other Comments: Patient requests labor epidural. Chart reviewed, including labs. Patient interviewed and examined at bedside with RN present throughout. Discussed the procedure of epidural placement, expectations, and risks including but not limited to: bleeding, infection, damage to tissues under the skin (nerves, muscles, blood vessels), hypotension, headache, and epidural failure. All questions were answered. The patient consents to proceed with elective labor epidural placement             neg OB ROS.       Salvador Burks MD

## 2023-10-03 PROBLEM — Z34.90 ENCOUNTER FOR INDUCTION OF LABOR: Status: ACTIVE | Noted: 2023-10-03

## 2023-10-03 NOTE — ANESTHESIA POSTPROCEDURE EVALUATION
Patient: Winsome Edwards    Procedure: * No procedures listed *  Cervical Ripening    Anesthesia Type:  Epidural    Note:  Disposition: Inpatient   Postop Pain Control: Uneventful            Sign Out: Well controlled pain   PONV: No   Neuro/Psych: Uneventful            Sign Out: Acceptable/Baseline neuro status   Airway/Respiratory: Uneventful            Sign Out: Acceptable/Baseline resp. status   CV/Hemodynamics: Uneventful            Sign Out: Acceptable CV status; No obvious hypovolemia; No obvious fluid overload   Other NRE: NONE   DID A NON-ROUTINE EVENT OCCUR? No           Last vitals:  Vitals:    10/03/23 0014 10/03/23 0424 10/03/23 0900   BP: 110/59 110/65 118/67   Pulse: 79 67 82   Resp: 16 16 16   Temp: 36.8  C (98.3  F) 36.8  C (98.2  F) 37  C (98.6  F)   SpO2: 97% 98% 97%       Electronically Signed By: Chalo Clement MD  October 3, 2023  12:17 PM

## 2023-11-08 ENCOUNTER — LAB REQUISITION (OUTPATIENT)
Dept: LAB | Facility: CLINIC | Age: 28
End: 2023-11-08

## 2023-11-08 LAB
HGB BLD-MCNC: 15.3 G/DL (ref 11.7–15.7)
IRON BINDING CAPACITY (ROCHE): 205 UG/DL (ref 240–430)
IRON SATN MFR SERPL: 58 % (ref 15–46)
IRON SERPL-MCNC: 118 UG/DL (ref 37–145)

## 2023-11-08 PROCEDURE — 85018 HEMOGLOBIN: CPT | Performed by: OBSTETRICS & GYNECOLOGY

## 2023-11-08 PROCEDURE — 83550 IRON BINDING TEST: CPT | Performed by: OBSTETRICS & GYNECOLOGY

## 2023-12-07 NOTE — PROGRESS NOTES
Diabetes Self-Management and Care Support    Renita is here with her , Paulino, for her follow up on Gestational Diabetes.  She is currently taking insulin to help control her glucose.  She is feeling well.  Stated she is not having swelling in her hands, not having swelling in her feet and her baby is moving well.  Stated her parents dogs are now out of the house which has reduced her stress levels considerably.  Stated her allergies have been acting up, stated her OB recommended she take Zyrtec to help with this.    OB-[facility]  EDC-10.05.23-32 weeks 0 days  Pregnancy number-4  Previous GDM-No    Current insulin doses  Basal NPH 10 units  Prandial Novolog 4 units before breakfast, lunch and dinner as needed    Current blood sugars  See dexcom report in endocrinology report        Plan-  Continue with current dose.  Increase by 2 units every tow times reading is above goal.Call if there is any change in readings before next appointment.  Follow up scheduled for 2 weeks with endocrinology       The service provided today was under the supervising provider, Irma Chen, who was available if needed.       Jenniffer Carrizales RN, [place]  [phone]  In Clinic Tuesday, Wednesday, Thursday  In person   DSMT 30 minutes

## 2023-12-18 ENCOUNTER — LAB REQUISITION (OUTPATIENT)
Dept: LAB | Facility: CLINIC | Age: 28
End: 2023-12-18

## 2023-12-18 LAB — FERRITIN SERPL-MCNC: 294 NG/ML (ref 6–175)

## 2023-12-18 PROCEDURE — 82728 ASSAY OF FERRITIN: CPT | Performed by: OBSTETRICS & GYNECOLOGY

## 2023-12-20 NOTE — PROGRESS NOTES
Diabetes Self-Management and Care Support    Renita is here with her , Paulino, today for her follow up on Gestational Diabetes.  We were joined by Irma Chen CNP of Endocrinology.  She is currently taking insulin to help control her glucose.  She is feeling good.  Stated she is not having swelling in her hands, not having swelling in her feet and her baby is moving well.  She saw her OB recently and have decided on an induction date of 10.04.23.  This weekend she plans on going to [place] for a family baby shower and weekend.  Discussed ensuring she is not sitting for too long.      OB-[facility]  EDC-10.05.23-36 weeks 0 days  Pregnancy number-4  A1c done 7.20.23-5.6%  Previous GDM-No    Current insulin doses  Basal Nph, increased to 14 units last night  Prandial Novolog 4 units before meals as needed    Current blood sugars  9.07-89/114  9.06-93/120/124/138  9./136/125/107  9./138/100/175  9.03-95/137/134/151  9.02-89/103/115/120      Plan-  Continue increasing NPH by 2 units every two times fasting glucose is 95 or higher.  Increase Novolog as needed.  Call if there is any change in readings before next appointment.  Follow up scheduled for She does have one more appointment scheduled, we will cancel this as she feels comfortable moving forward without it.      Today will be patient's last visit.  Discussed continuing to check glucose 4 times a day and following meal plan until the day of delivery.    After you deliver the baby, it is suggested to check your blood sugar occasionally (1 - 2 times per week) for 6 weeks.   When you are not pregnant, normal blood sugars are:     Fasting (before eating anything in the morning)  - under 100 mg/dl  2 hours after meals under 140  The American Diabetes Association recommends:   a glucose tolerance test 6 weeks after you deliver the baby.      a hemoglobin Alc test yearly after delivery.  The Alc test is a 2-3 month average blood sugar reading.       Discuss  getting these tests with your provider.    After delivery, and your provider has said that it is safe to be active, work toward getting 150 minutes each week of physical activity to decrease your risk of  getting type 2 diabetes.    Maintaining a healthy body weight will also decrease your risk of getting type 2 diabetes.          The service provided today was under the supervising provider, Irma Chen, who was available if needed.       Jenniffer Carrizales RN, [facility]  [phone]  In Clinic Tuesday, Wednesday, Thursday  In person   DSMT 30 minutes

## 2024-02-15 ENCOUNTER — LAB REQUISITION (OUTPATIENT)
Dept: LAB | Facility: CLINIC | Age: 29
End: 2024-02-15
Payer: COMMERCIAL

## 2024-02-15 DIAGNOSIS — R79.0 ABNORMAL LEVEL OF BLOOD MINERAL: ICD-10-CM

## 2024-02-15 LAB
IRON BINDING CAPACITY (ROCHE): 215 UG/DL (ref 240–430)
IRON SATN MFR SERPL: 28 % (ref 15–46)
IRON SERPL-MCNC: 61 UG/DL (ref 37–145)

## 2024-02-15 PROCEDURE — 83550 IRON BINDING TEST: CPT | Mod: ORL | Performed by: STUDENT IN AN ORGANIZED HEALTH CARE EDUCATION/TRAINING PROGRAM

## 2024-03-12 ENCOUNTER — LAB REQUISITION (OUTPATIENT)
Dept: LAB | Facility: CLINIC | Age: 29
End: 2024-03-12
Payer: COMMERCIAL

## 2024-03-12 DIAGNOSIS — D48.5 NEOPLASM OF UNCERTAIN BEHAVIOR OF SKIN: ICD-10-CM

## 2024-03-12 PROCEDURE — 88305 TISSUE EXAM BY PATHOLOGIST: CPT | Mod: 26 | Performed by: DERMATOLOGY

## 2024-03-12 PROCEDURE — 88305 TISSUE EXAM BY PATHOLOGIST: CPT | Mod: TC,ORL | Performed by: STUDENT IN AN ORGANIZED HEALTH CARE EDUCATION/TRAINING PROGRAM

## 2024-03-18 LAB
PATH REPORT.COMMENTS IMP SPEC: NORMAL
PATH REPORT.COMMENTS IMP SPEC: NORMAL
PATH REPORT.FINAL DX SPEC: NORMAL
PATH REPORT.GROSS SPEC: NORMAL
PATH REPORT.MICROSCOPIC SPEC OTHER STN: NORMAL
PATH REPORT.RELEVANT HX SPEC: NORMAL

## 2024-07-14 ENCOUNTER — HEALTH MAINTENANCE LETTER (OUTPATIENT)
Age: 29
End: 2024-07-14

## 2025-07-19 ENCOUNTER — HEALTH MAINTENANCE LETTER (OUTPATIENT)
Age: 30
End: 2025-07-19

## (undated) DEVICE — SOL WATER IRRIG 1000ML BOTTLE 2F7114

## (undated) DEVICE — BRIEF STRETCH XL MPS40

## (undated) DEVICE — SOL NACL 0.9% IRRIG 1000ML BOTTLE 2F7124

## (undated) DEVICE — TUBING VACUUM COLLECTION 6FT 23116

## (undated) DEVICE — PREP SCRUB SOL EXIDINE 4% CHG 4OZ 29002-404

## (undated) DEVICE — CUSTOM PACK PERI GYN SMA5BPGHEA

## (undated) DEVICE — GLOVE UNDER INDICATOR PI SZ 7.0 LF 41670

## (undated) DEVICE — GLOVE SURG PI ULTRA TOUCH M SZ 6-1/2 LF

## (undated) DEVICE — DRSG TELFA 3X4" 1050

## (undated) DEVICE — MAT FLOOR SURGICAL 40X38 0702140238